# Patient Record
Sex: FEMALE | Race: WHITE | NOT HISPANIC OR LATINO
[De-identification: names, ages, dates, MRNs, and addresses within clinical notes are randomized per-mention and may not be internally consistent; named-entity substitution may affect disease eponyms.]

---

## 2021-07-05 ENCOUNTER — TRANSCRIPTION ENCOUNTER (OUTPATIENT)
Age: 86
End: 2021-07-05

## 2023-01-01 ENCOUNTER — EMERGENCY (EMERGENCY)
Facility: HOSPITAL | Age: 88
LOS: 1 days | Discharge: HOME | End: 2023-01-01
Attending: EMERGENCY MEDICINE
Payer: MEDICARE

## 2023-01-01 ENCOUNTER — NON-APPOINTMENT (OUTPATIENT)
Age: 88
End: 2023-01-01

## 2023-01-01 ENCOUNTER — INPATIENT (INPATIENT)
Facility: HOSPITAL | Age: 88
LOS: 3 days | DRG: 199 | End: 2023-06-20
Attending: STUDENT IN AN ORGANIZED HEALTH CARE EDUCATION/TRAINING PROGRAM | Admitting: STUDENT IN AN ORGANIZED HEALTH CARE EDUCATION/TRAINING PROGRAM
Payer: MEDICARE

## 2023-01-01 VITALS
HEART RATE: 64 BPM | HEIGHT: 60 IN | WEIGHT: 97 LBS | OXYGEN SATURATION: 94 % | RESPIRATION RATE: 20 BRPM | TEMPERATURE: 98 F | DIASTOLIC BLOOD PRESSURE: 77 MMHG | SYSTOLIC BLOOD PRESSURE: 222 MMHG

## 2023-01-01 VITALS
HEART RATE: 60 BPM | SYSTOLIC BLOOD PRESSURE: 196 MMHG | WEIGHT: 100.09 LBS | TEMPERATURE: 98 F | RESPIRATION RATE: 18 BRPM | OXYGEN SATURATION: 98 % | DIASTOLIC BLOOD PRESSURE: 73 MMHG

## 2023-01-01 VITALS
TEMPERATURE: 98 F | SYSTOLIC BLOOD PRESSURE: 190 MMHG | OXYGEN SATURATION: 96 % | DIASTOLIC BLOOD PRESSURE: 69 MMHG | RESPIRATION RATE: 18 BRPM | HEART RATE: 64 BPM

## 2023-01-01 VITALS
TEMPERATURE: 98 F | HEART RATE: 67 BPM | OXYGEN SATURATION: 97 % | DIASTOLIC BLOOD PRESSURE: 72 MMHG | SYSTOLIC BLOOD PRESSURE: 200 MMHG | RESPIRATION RATE: 18 BRPM

## 2023-01-01 VITALS — TEMPERATURE: 97 F | RESPIRATION RATE: 19 BRPM | HEART RATE: 77 BPM

## 2023-01-01 DIAGNOSIS — S22.41XA MULTIPLE FRACTURES OF RIBS, RIGHT SIDE, INITIAL ENCOUNTER FOR CLOSED FRACTURE: ICD-10-CM

## 2023-01-01 DIAGNOSIS — I71.00 DISSECTION OF UNSPECIFIED SITE OF AORTA: ICD-10-CM

## 2023-01-01 DIAGNOSIS — S22.49XA MULTIPLE FRACTURES OF RIBS, UNSPECIFIED SIDE, INITIAL ENCOUNTER FOR CLOSED FRACTURE: ICD-10-CM

## 2023-01-01 LAB
ABO RH CONFIRMATION: SIGNIFICANT CHANGE UP
ALBUMIN SERPL ELPH-MCNC: 3.6 G/DL — SIGNIFICANT CHANGE UP (ref 3.3–5.2)
ALBUMIN SERPL ELPH-MCNC: 3.6 G/DL — SIGNIFICANT CHANGE UP (ref 3.3–5.2)
ALP SERPL-CCNC: 86 U/L — SIGNIFICANT CHANGE UP (ref 40–120)
ALP SERPL-CCNC: 87 U/L — SIGNIFICANT CHANGE UP (ref 40–120)
ALT FLD-CCNC: 16 U/L — SIGNIFICANT CHANGE UP
ALT FLD-CCNC: 16 U/L — SIGNIFICANT CHANGE UP
ANION GAP SERPL CALC-SCNC: 13 MMOL/L — SIGNIFICANT CHANGE UP (ref 5–17)
ANION GAP SERPL CALC-SCNC: 14 MMOL/L — SIGNIFICANT CHANGE UP (ref 5–17)
ANION GAP SERPL CALC-SCNC: 15 MMOL/L — SIGNIFICANT CHANGE UP (ref 5–17)
ANION GAP SERPL CALC-SCNC: 15 MMOL/L — SIGNIFICANT CHANGE UP (ref 5–17)
ANION GAP SERPL CALC-SCNC: 9 MMOL/L — SIGNIFICANT CHANGE UP (ref 5–17)
ANISOCYTOSIS BLD QL: SIGNIFICANT CHANGE UP
ANISOCYTOSIS BLD QL: SLIGHT — SIGNIFICANT CHANGE UP
APTT BLD: 22 SEC — LOW (ref 27.5–35.5)
APTT BLD: 34.6 SEC — SIGNIFICANT CHANGE UP (ref 27.5–35.5)
AST SERPL-CCNC: 21 U/L — SIGNIFICANT CHANGE UP
AST SERPL-CCNC: 22 U/L — SIGNIFICANT CHANGE UP
BASE EXCESS BLDV CALC-SCNC: 3.3 MMOL/L — HIGH (ref -2–3)
BASO STIPL BLD QL SMEAR: PRESENT — SIGNIFICANT CHANGE UP
BASOPHILS # BLD AUTO: 0 K/UL — SIGNIFICANT CHANGE UP (ref 0–0.2)
BASOPHILS # BLD AUTO: 0.09 K/UL — SIGNIFICANT CHANGE UP (ref 0–0.2)
BASOPHILS NFR BLD AUTO: 0 % — SIGNIFICANT CHANGE UP (ref 0–2)
BASOPHILS NFR BLD AUTO: 0.9 % — SIGNIFICANT CHANGE UP (ref 0–2)
BILIRUB DIRECT SERPL-MCNC: 0.1 MG/DL — SIGNIFICANT CHANGE UP (ref 0–0.3)
BILIRUB INDIRECT FLD-MCNC: 0.3 MG/DL — SIGNIFICANT CHANGE UP (ref 0.2–1)
BILIRUB SERPL-MCNC: 0.4 MG/DL — SIGNIFICANT CHANGE UP (ref 0.4–2)
BILIRUB SERPL-MCNC: 0.4 MG/DL — SIGNIFICANT CHANGE UP (ref 0.4–2)
BLD GP AB SCN SERPL QL: SIGNIFICANT CHANGE UP
BUN SERPL-MCNC: 25.7 MG/DL — HIGH (ref 8–20)
BUN SERPL-MCNC: 29 MG/DL — HIGH (ref 8–20)
BUN SERPL-MCNC: 30.4 MG/DL — HIGH (ref 8–20)
BUN SERPL-MCNC: 30.7 MG/DL — HIGH (ref 8–20)
BUN SERPL-MCNC: 34.9 MG/DL — HIGH (ref 8–20)
BUN SERPL-MCNC: 35.7 MG/DL — HIGH (ref 8–20)
BUN SERPL-MCNC: 36.1 MG/DL — HIGH (ref 8–20)
BURR CELLS BLD QL SMEAR: PRESENT — SIGNIFICANT CHANGE UP
BURR CELLS BLD QL SMEAR: PRESENT — SIGNIFICANT CHANGE UP
CA-I SERPL-SCNC: 1.15 MMOL/L — SIGNIFICANT CHANGE UP (ref 1.15–1.33)
CALCIUM SERPL-MCNC: 6.7 MG/DL — LOW (ref 8.4–10.5)
CALCIUM SERPL-MCNC: 7.7 MG/DL — LOW (ref 8.4–10.5)
CALCIUM SERPL-MCNC: 8 MG/DL — LOW (ref 8.4–10.5)
CALCIUM SERPL-MCNC: 8.3 MG/DL — LOW (ref 8.4–10.5)
CALCIUM SERPL-MCNC: 8.3 MG/DL — LOW (ref 8.4–10.5)
CALCIUM SERPL-MCNC: 8.7 MG/DL — SIGNIFICANT CHANGE UP (ref 8.4–10.5)
CALCIUM SERPL-MCNC: 9.2 MG/DL — SIGNIFICANT CHANGE UP (ref 8.4–10.5)
CHLORIDE BLDV-SCNC: 103 MMOL/L — SIGNIFICANT CHANGE UP (ref 96–108)
CHLORIDE SERPL-SCNC: 101 MMOL/L — SIGNIFICANT CHANGE UP (ref 96–108)
CHLORIDE SERPL-SCNC: 103 MMOL/L — SIGNIFICANT CHANGE UP (ref 96–108)
CHLORIDE SERPL-SCNC: 107 MMOL/L — SIGNIFICANT CHANGE UP (ref 96–108)
CO2 SERPL-SCNC: 20 MMOL/L — LOW (ref 22–29)
CO2 SERPL-SCNC: 21 MMOL/L — LOW (ref 22–29)
CO2 SERPL-SCNC: 21 MMOL/L — LOW (ref 22–29)
CO2 SERPL-SCNC: 22 MMOL/L — SIGNIFICANT CHANGE UP (ref 22–29)
CO2 SERPL-SCNC: 25 MMOL/L — SIGNIFICANT CHANGE UP (ref 22–29)
CREAT SERPL-MCNC: 1.27 MG/DL — SIGNIFICANT CHANGE UP (ref 0.5–1.3)
CREAT SERPL-MCNC: 1.45 MG/DL — HIGH (ref 0.5–1.3)
CREAT SERPL-MCNC: 1.6 MG/DL — HIGH (ref 0.5–1.3)
CREAT SERPL-MCNC: 1.78 MG/DL — HIGH (ref 0.5–1.3)
CREAT SERPL-MCNC: 2.03 MG/DL — HIGH (ref 0.5–1.3)
CREAT SERPL-MCNC: 2.03 MG/DL — HIGH (ref 0.5–1.3)
CREAT SERPL-MCNC: 2.11 MG/DL — HIGH (ref 0.5–1.3)
EGFR: 21 ML/MIN/1.73M2 — LOW
EGFR: 22 ML/MIN/1.73M2 — LOW
EGFR: 22 ML/MIN/1.73M2 — LOW
EGFR: 26 ML/MIN/1.73M2 — LOW
EGFR: 30 ML/MIN/1.73M2 — LOW
EGFR: 33 ML/MIN/1.73M2 — LOW
EGFR: 39 ML/MIN/1.73M2 — LOW
ELLIPTOCYTES BLD QL SMEAR: SIGNIFICANT CHANGE UP
ELLIPTOCYTES BLD QL SMEAR: SLIGHT — SIGNIFICANT CHANGE UP
ELLIPTOCYTES BLD QL SMEAR: SLIGHT — SIGNIFICANT CHANGE UP
EOSINOPHIL # BLD AUTO: 0 K/UL — SIGNIFICANT CHANGE UP (ref 0–0.5)
EOSINOPHIL # BLD AUTO: 0.07 K/UL — SIGNIFICANT CHANGE UP (ref 0–0.5)
EOSINOPHIL # BLD AUTO: 0.18 K/UL — SIGNIFICANT CHANGE UP (ref 0–0.5)
EOSINOPHIL NFR BLD AUTO: 0 % — SIGNIFICANT CHANGE UP (ref 0–6)
EOSINOPHIL NFR BLD AUTO: 0.9 % — SIGNIFICANT CHANGE UP (ref 0–6)
EOSINOPHIL NFR BLD AUTO: 1.8 % — SIGNIFICANT CHANGE UP (ref 0–6)
FIBRINOGEN PPP-MCNC: 401 MG/DL — SIGNIFICANT CHANGE UP (ref 200–450)
GAS PNL BLDA: SIGNIFICANT CHANGE UP
GAS PNL BLDV: 133 MMOL/L — LOW (ref 136–145)
GAS PNL BLDV: SIGNIFICANT CHANGE UP
GIANT PLATELETS BLD QL SMEAR: PRESENT — SIGNIFICANT CHANGE UP
GIANT PLATELETS BLD QL SMEAR: PRESENT — SIGNIFICANT CHANGE UP
GLUCOSE BLDV-MCNC: 139 MG/DL — HIGH (ref 70–99)
GLUCOSE SERPL-MCNC: 110 MG/DL — HIGH (ref 70–99)
GLUCOSE SERPL-MCNC: 111 MG/DL — HIGH (ref 70–99)
GLUCOSE SERPL-MCNC: 113 MG/DL — HIGH (ref 70–99)
GLUCOSE SERPL-MCNC: 132 MG/DL — HIGH (ref 70–99)
GLUCOSE SERPL-MCNC: 84 MG/DL — SIGNIFICANT CHANGE UP (ref 70–99)
GLUCOSE SERPL-MCNC: 90 MG/DL — SIGNIFICANT CHANGE UP (ref 70–99)
GLUCOSE SERPL-MCNC: 90 MG/DL — SIGNIFICANT CHANGE UP (ref 70–99)
HCO3 BLDV-SCNC: 27 MMOL/L — SIGNIFICANT CHANGE UP (ref 22–29)
HCT VFR BLD CALC: 18.7 % — CRITICAL LOW (ref 34.5–45)
HCT VFR BLD CALC: 25.6 % — LOW (ref 34.5–45)
HCT VFR BLD CALC: 27.8 % — LOW (ref 34.5–45)
HCT VFR BLD CALC: 28.1 % — LOW (ref 34.5–45)
HCT VFR BLD CALC: 32.5 % — LOW (ref 34.5–45)
HCT VFR BLD CALC: 35.4 % — SIGNIFICANT CHANGE UP (ref 34.5–45)
HCT VFR BLD CALC: 35.6 % — SIGNIFICANT CHANGE UP (ref 34.5–45)
HCT VFR BLDA CALC: 26 % — SIGNIFICANT CHANGE UP
HGB BLD CALC-MCNC: 8.7 G/DL — LOW (ref 11.7–16.1)
HGB BLD-MCNC: 10.5 G/DL — LOW (ref 11.5–15.5)
HGB BLD-MCNC: 11.4 G/DL — LOW (ref 11.5–15.5)
HGB BLD-MCNC: 11.4 G/DL — LOW (ref 11.5–15.5)
HGB BLD-MCNC: 5.7 G/DL — CRITICAL LOW (ref 11.5–15.5)
HGB BLD-MCNC: 8.1 G/DL — LOW (ref 11.5–15.5)
HGB BLD-MCNC: 8.8 G/DL — LOW (ref 11.5–15.5)
HGB BLD-MCNC: 8.8 G/DL — LOW (ref 11.5–15.5)
HYPOCHROMIA BLD QL: SLIGHT — SIGNIFICANT CHANGE UP
INR BLD: 1.02 RATIO — SIGNIFICANT CHANGE UP (ref 0.88–1.16)
INR BLD: 1.03 RATIO — SIGNIFICANT CHANGE UP (ref 0.88–1.16)
INR BLD: 1.07 RATIO — SIGNIFICANT CHANGE UP (ref 0.88–1.16)
LACTATE BLDV-MCNC: 1.3 MMOL/L — SIGNIFICANT CHANGE UP (ref 0.5–2)
LACTATE SERPL-SCNC: 0.7 MMOL/L — SIGNIFICANT CHANGE UP (ref 0.5–2)
LYMPHOCYTES # BLD AUTO: 0.15 K/UL — LOW (ref 1–3.3)
LYMPHOCYTES # BLD AUTO: 0.17 K/UL — LOW (ref 1–3.3)
LYMPHOCYTES # BLD AUTO: 0.4 K/UL — LOW (ref 1–3.3)
LYMPHOCYTES # BLD AUTO: 0.77 K/UL — LOW (ref 1–3.3)
LYMPHOCYTES # BLD AUTO: 1.52 K/UL — SIGNIFICANT CHANGE UP (ref 1–3.3)
LYMPHOCYTES # BLD AUTO: 1.8 % — LOW (ref 13–44)
LYMPHOCYTES # BLD AUTO: 12.4 % — LOW (ref 13–44)
LYMPHOCYTES # BLD AUTO: 15 % — SIGNIFICANT CHANGE UP (ref 13–44)
LYMPHOCYTES # BLD AUTO: 2.6 % — LOW (ref 13–44)
LYMPHOCYTES # BLD AUTO: 5.2 % — LOW (ref 13–44)
MACROCYTES BLD QL: SLIGHT — SIGNIFICANT CHANGE UP
MAGNESIUM SERPL-MCNC: 1.6 MG/DL — SIGNIFICANT CHANGE UP (ref 1.6–2.6)
MAGNESIUM SERPL-MCNC: 2 MG/DL — SIGNIFICANT CHANGE UP (ref 1.6–2.6)
MAGNESIUM SERPL-MCNC: 2 MG/DL — SIGNIFICANT CHANGE UP (ref 1.8–2.6)
MAGNESIUM SERPL-MCNC: 2.1 MG/DL — SIGNIFICANT CHANGE UP (ref 1.6–2.6)
MAGNESIUM SERPL-MCNC: 2.2 MG/DL — SIGNIFICANT CHANGE UP (ref 1.6–2.6)
MAGNESIUM SERPL-MCNC: 2.3 MG/DL — SIGNIFICANT CHANGE UP (ref 1.6–2.6)
MANUAL SMEAR VERIFICATION: SIGNIFICANT CHANGE UP
MCHC RBC-ENTMCNC: 18.6 PG — LOW (ref 27–34)
MCHC RBC-ENTMCNC: 18.9 PG — LOW (ref 27–34)
MCHC RBC-ENTMCNC: 19 PG — LOW (ref 27–34)
MCHC RBC-ENTMCNC: 21.1 PG — LOW (ref 27–34)
MCHC RBC-ENTMCNC: 21.8 PG — LOW (ref 27–34)
MCHC RBC-ENTMCNC: 21.8 PG — LOW (ref 27–34)
MCHC RBC-ENTMCNC: 21.9 PG — LOW (ref 27–34)
MCHC RBC-ENTMCNC: 30.5 GM/DL — LOW (ref 32–36)
MCHC RBC-ENTMCNC: 31.3 GM/DL — LOW (ref 32–36)
MCHC RBC-ENTMCNC: 31.6 GM/DL — LOW (ref 32–36)
MCHC RBC-ENTMCNC: 31.7 GM/DL — LOW (ref 32–36)
MCHC RBC-ENTMCNC: 32 GM/DL — SIGNIFICANT CHANGE UP (ref 32–36)
MCHC RBC-ENTMCNC: 32.2 GM/DL — SIGNIFICANT CHANGE UP (ref 32–36)
MCHC RBC-ENTMCNC: 32.3 GM/DL — SIGNIFICANT CHANGE UP (ref 32–36)
MCV RBC AUTO: 59.7 FL — LOW (ref 80–100)
MCV RBC AUTO: 60.6 FL — LOW (ref 80–100)
MCV RBC AUTO: 60.9 FL — LOW (ref 80–100)
MCV RBC AUTO: 66.5 FL — LOW (ref 80–100)
MCV RBC AUTO: 67.6 FL — LOW (ref 80–100)
MCV RBC AUTO: 67.9 FL — LOW (ref 80–100)
MCV RBC AUTO: 68.1 FL — LOW (ref 80–100)
MICROCYTES BLD QL: SIGNIFICANT CHANGE UP
MICROCYTES BLD QL: SLIGHT — SIGNIFICANT CHANGE UP
MONOCYTES # BLD AUTO: 0.05 K/UL — SIGNIFICANT CHANGE UP (ref 0–0.9)
MONOCYTES # BLD AUTO: 0.25 K/UL — SIGNIFICANT CHANGE UP (ref 0–0.9)
MONOCYTES # BLD AUTO: 0.33 K/UL — SIGNIFICANT CHANGE UP (ref 0–0.9)
MONOCYTES # BLD AUTO: 0.4 K/UL — SIGNIFICANT CHANGE UP (ref 0–0.9)
MONOCYTES # BLD AUTO: 0.72 K/UL — SIGNIFICANT CHANGE UP (ref 0–0.9)
MONOCYTES NFR BLD AUTO: 0.9 % — LOW (ref 2–14)
MONOCYTES NFR BLD AUTO: 2.6 % — SIGNIFICANT CHANGE UP (ref 2–14)
MONOCYTES NFR BLD AUTO: 5.2 % — SIGNIFICANT CHANGE UP (ref 2–14)
MONOCYTES NFR BLD AUTO: 5.3 % — SIGNIFICANT CHANGE UP (ref 2–14)
MONOCYTES NFR BLD AUTO: 7.1 % — SIGNIFICANT CHANGE UP (ref 2–14)
MRSA PCR RESULT.: SIGNIFICANT CHANGE UP
NEUTROPHILS # BLD AUTO: 5.08 K/UL — SIGNIFICANT CHANGE UP (ref 1.8–7.4)
NEUTROPHILS # BLD AUTO: 5.58 K/UL — SIGNIFICANT CHANGE UP (ref 1.8–7.4)
NEUTROPHILS # BLD AUTO: 6.77 K/UL — SIGNIFICANT CHANGE UP (ref 1.8–7.4)
NEUTROPHILS # BLD AUTO: 7.63 K/UL — HIGH (ref 1.8–7.4)
NEUTROPHILS # BLD AUTO: 8.92 K/UL — HIGH (ref 1.8–7.4)
NEUTROPHILS NFR BLD AUTO: 75.2 % — SIGNIFICANT CHANGE UP (ref 43–77)
NEUTROPHILS NFR BLD AUTO: 82.3 % — HIGH (ref 43–77)
NEUTROPHILS NFR BLD AUTO: 87.8 % — HIGH (ref 43–77)
NEUTROPHILS NFR BLD AUTO: 90.3 % — HIGH (ref 43–77)
NEUTROPHILS NFR BLD AUTO: 96.5 % — HIGH (ref 43–77)
NEUTS BAND # BLD: 2.6 % — SIGNIFICANT CHANGE UP (ref 0–8)
NRBC # BLD: 1 /100 — HIGH (ref 0–0)
OVALOCYTES BLD QL SMEAR: SIGNIFICANT CHANGE UP
OVALOCYTES BLD QL SMEAR: SIGNIFICANT CHANGE UP
OVALOCYTES BLD QL SMEAR: SLIGHT — SIGNIFICANT CHANGE UP
OVALOCYTES BLD QL SMEAR: SLIGHT — SIGNIFICANT CHANGE UP
PCO2 BLDV: 36 MMHG — LOW (ref 39–42)
PH BLDV: 7.48 — HIGH (ref 7.32–7.43)
PHOSPHATE SERPL-MCNC: 5.2 MG/DL — HIGH (ref 2.4–4.7)
PHOSPHATE SERPL-MCNC: 5.8 MG/DL — HIGH (ref 2.4–4.7)
PHOSPHATE SERPL-MCNC: 5.9 MG/DL — HIGH (ref 2.4–4.7)
PHOSPHATE SERPL-MCNC: 6.4 MG/DL — HIGH (ref 2.4–4.7)
PHOSPHATE SERPL-MCNC: 6.4 MG/DL — HIGH (ref 2.4–4.7)
PHOSPHATE SERPL-MCNC: 6.6 MG/DL — HIGH (ref 2.4–4.7)
PLAT MORPH BLD: NORMAL — SIGNIFICANT CHANGE UP
PLATELET # BLD AUTO: 128 K/UL — LOW (ref 150–400)
PLATELET # BLD AUTO: 136 K/UL — LOW (ref 150–400)
PLATELET # BLD AUTO: 139 K/UL — LOW (ref 150–400)
PLATELET # BLD AUTO: 159 K/UL — SIGNIFICANT CHANGE UP (ref 150–400)
PLATELET # BLD AUTO: 160 K/UL — SIGNIFICANT CHANGE UP (ref 150–400)
PLATELET # BLD AUTO: 227 K/UL — SIGNIFICANT CHANGE UP (ref 150–400)
PLATELET # BLD AUTO: 245 K/UL — SIGNIFICANT CHANGE UP (ref 150–400)
PO2 BLDV: 151 MMHG — HIGH (ref 25–45)
POIKILOCYTOSIS BLD QL AUTO: SIGNIFICANT CHANGE UP
POIKILOCYTOSIS BLD QL AUTO: SLIGHT — SIGNIFICANT CHANGE UP
POLYCHROMASIA BLD QL SMEAR: SIGNIFICANT CHANGE UP
POLYCHROMASIA BLD QL SMEAR: SIGNIFICANT CHANGE UP
POLYCHROMASIA BLD QL SMEAR: SLIGHT — SIGNIFICANT CHANGE UP
POTASSIUM BLDV-SCNC: 4.9 MMOL/L — SIGNIFICANT CHANGE UP (ref 3.5–5.1)
POTASSIUM SERPL-MCNC: 4.3 MMOL/L — SIGNIFICANT CHANGE UP (ref 3.5–5.3)
POTASSIUM SERPL-MCNC: 4.8 MMOL/L — SIGNIFICANT CHANGE UP (ref 3.5–5.3)
POTASSIUM SERPL-MCNC: 4.9 MMOL/L — SIGNIFICANT CHANGE UP (ref 3.5–5.3)
POTASSIUM SERPL-MCNC: 5.1 MMOL/L — SIGNIFICANT CHANGE UP (ref 3.5–5.3)
POTASSIUM SERPL-MCNC: 5.1 MMOL/L — SIGNIFICANT CHANGE UP (ref 3.5–5.3)
POTASSIUM SERPL-MCNC: 5.5 MMOL/L — HIGH (ref 3.5–5.3)
POTASSIUM SERPL-MCNC: 5.5 MMOL/L — HIGH (ref 3.5–5.3)
POTASSIUM SERPL-SCNC: 4.3 MMOL/L — SIGNIFICANT CHANGE UP (ref 3.5–5.3)
POTASSIUM SERPL-SCNC: 4.8 MMOL/L — SIGNIFICANT CHANGE UP (ref 3.5–5.3)
POTASSIUM SERPL-SCNC: 4.9 MMOL/L — SIGNIFICANT CHANGE UP (ref 3.5–5.3)
POTASSIUM SERPL-SCNC: 5.1 MMOL/L — SIGNIFICANT CHANGE UP (ref 3.5–5.3)
POTASSIUM SERPL-SCNC: 5.1 MMOL/L — SIGNIFICANT CHANGE UP (ref 3.5–5.3)
POTASSIUM SERPL-SCNC: 5.5 MMOL/L — HIGH (ref 3.5–5.3)
POTASSIUM SERPL-SCNC: 5.5 MMOL/L — HIGH (ref 3.5–5.3)
PROT SERPL-MCNC: 6.6 G/DL — SIGNIFICANT CHANGE UP (ref 6.6–8.7)
PROT SERPL-MCNC: 6.7 G/DL — SIGNIFICANT CHANGE UP (ref 6.6–8.7)
PROTHROM AB SERPL-ACNC: 11.8 SEC — SIGNIFICANT CHANGE UP (ref 10.5–13.4)
PROTHROM AB SERPL-ACNC: 11.9 SEC — SIGNIFICANT CHANGE UP (ref 10.5–13.4)
PROTHROM AB SERPL-ACNC: 12.4 SEC — SIGNIFICANT CHANGE UP (ref 10.5–13.4)
RBC # BLD: 3.07 M/UL — LOW (ref 3.8–5.2)
RBC # BLD: 4.18 M/UL — SIGNIFICANT CHANGE UP (ref 3.8–5.2)
RBC # BLD: 4.29 M/UL — SIGNIFICANT CHANGE UP (ref 3.8–5.2)
RBC # BLD: 4.64 M/UL — SIGNIFICANT CHANGE UP (ref 3.8–5.2)
RBC # BLD: 4.81 M/UL — SIGNIFICANT CHANGE UP (ref 3.8–5.2)
RBC # BLD: 5.21 M/UL — HIGH (ref 3.8–5.2)
RBC # BLD: 5.23 M/UL — HIGH (ref 3.8–5.2)
RBC # FLD: 17.2 % — HIGH (ref 10.3–14.5)
RBC # FLD: 22 % — HIGH (ref 10.3–14.5)
RBC # FLD: 22.4 % — HIGH (ref 10.3–14.5)
RBC # FLD: 22.5 % — HIGH (ref 10.3–14.5)
RBC # FLD: 22.9 % — HIGH (ref 10.3–14.5)
RBC BLD AUTO: ABNORMAL
ROULEAUX BLD QL SMEAR: PRESENT
S AUREUS DNA NOSE QL NAA+PROBE: DETECTED
SAO2 % BLDV: 98.8 % — SIGNIFICANT CHANGE UP
SCHISTOCYTES BLD QL AUTO: SIGNIFICANT CHANGE UP
SCHISTOCYTES BLD QL AUTO: SLIGHT — SIGNIFICANT CHANGE UP
SCHISTOCYTES BLD QL AUTO: SLIGHT — SIGNIFICANT CHANGE UP
SMUDGE CELLS # BLD: PRESENT — SIGNIFICANT CHANGE UP
SODIUM SERPL-SCNC: 135 MMOL/L — SIGNIFICANT CHANGE UP (ref 135–145)
SODIUM SERPL-SCNC: 136 MMOL/L — SIGNIFICANT CHANGE UP (ref 135–145)
SODIUM SERPL-SCNC: 137 MMOL/L — SIGNIFICANT CHANGE UP (ref 135–145)
SODIUM SERPL-SCNC: 137 MMOL/L — SIGNIFICANT CHANGE UP (ref 135–145)
SODIUM SERPL-SCNC: 138 MMOL/L — SIGNIFICANT CHANGE UP (ref 135–145)
TARGETS BLD QL SMEAR: SLIGHT — SIGNIFICANT CHANGE UP
VARIANT LYMPHS # BLD: 0.9 % — SIGNIFICANT CHANGE UP (ref 0–6)
VARIANT LYMPHS # BLD: 0.9 % — SIGNIFICANT CHANGE UP (ref 0–6)
VARIANT LYMPHS # BLD: 1.8 % — SIGNIFICANT CHANGE UP (ref 0–6)
WBC # BLD: 10.14 K/UL — SIGNIFICANT CHANGE UP (ref 3.8–10.5)
WBC # BLD: 10.47 K/UL — SIGNIFICANT CHANGE UP (ref 3.8–10.5)
WBC # BLD: 17.8 K/UL — HIGH (ref 3.8–10.5)
WBC # BLD: 5.78 K/UL — SIGNIFICANT CHANGE UP (ref 3.8–10.5)
WBC # BLD: 6.17 K/UL — SIGNIFICANT CHANGE UP (ref 3.8–10.5)
WBC # BLD: 7.71 K/UL — SIGNIFICANT CHANGE UP (ref 3.8–10.5)
WBC # BLD: 9.6 K/UL — SIGNIFICANT CHANGE UP (ref 3.8–10.5)
WBC # FLD AUTO: 10.14 K/UL — SIGNIFICANT CHANGE UP (ref 3.8–10.5)
WBC # FLD AUTO: 10.47 K/UL — SIGNIFICANT CHANGE UP (ref 3.8–10.5)
WBC # FLD AUTO: 17.8 K/UL — HIGH (ref 3.8–10.5)
WBC # FLD AUTO: 5.78 K/UL — SIGNIFICANT CHANGE UP (ref 3.8–10.5)
WBC # FLD AUTO: 6.17 K/UL — SIGNIFICANT CHANGE UP (ref 3.8–10.5)
WBC # FLD AUTO: 7.71 K/UL — SIGNIFICANT CHANGE UP (ref 3.8–10.5)
WBC # FLD AUTO: 9.6 K/UL — SIGNIFICANT CHANGE UP (ref 3.8–10.5)

## 2023-01-01 PROCEDURE — 84132 ASSAY OF SERUM POTASSIUM: CPT

## 2023-01-01 PROCEDURE — 70450 CT HEAD/BRAIN W/O DYE: CPT | Mod: 26,MA

## 2023-01-01 PROCEDURE — 99291 CRITICAL CARE FIRST HOUR: CPT

## 2023-01-01 PROCEDURE — 82435 ASSAY OF BLOOD CHLORIDE: CPT

## 2023-01-01 PROCEDURE — 90715 TDAP VACCINE 7 YRS/> IM: CPT

## 2023-01-01 PROCEDURE — 72192 CT PELVIS W/O DYE: CPT | Mod: 26,MA

## 2023-01-01 PROCEDURE — 93010 ELECTROCARDIOGRAM REPORT: CPT | Mod: 77

## 2023-01-01 PROCEDURE — 72125 CT NECK SPINE W/O DYE: CPT | Mod: MA

## 2023-01-01 PROCEDURE — 72192 CT PELVIS W/O DYE: CPT | Mod: MA

## 2023-01-01 PROCEDURE — 80048 BASIC METABOLIC PNL TOTAL CA: CPT

## 2023-01-01 PROCEDURE — 84295 ASSAY OF SERUM SODIUM: CPT

## 2023-01-01 PROCEDURE — 86900 BLOOD TYPING SEROLOGIC ABO: CPT

## 2023-01-01 PROCEDURE — 99291 CRITICAL CARE FIRST HOUR: CPT | Mod: 25

## 2023-01-01 PROCEDURE — 84100 ASSAY OF PHOSPHORUS: CPT

## 2023-01-01 PROCEDURE — 71275 CT ANGIOGRAPHY CHEST: CPT | Mod: MA

## 2023-01-01 PROCEDURE — 36415 COLL VENOUS BLD VENIPUNCTURE: CPT

## 2023-01-01 PROCEDURE — 86850 RBC ANTIBODY SCREEN: CPT

## 2023-01-01 PROCEDURE — 71045 X-RAY EXAM CHEST 1 VIEW: CPT | Mod: 26

## 2023-01-01 PROCEDURE — 83605 ASSAY OF LACTIC ACID: CPT

## 2023-01-01 PROCEDURE — 83735 ASSAY OF MAGNESIUM: CPT

## 2023-01-01 PROCEDURE — P9040: CPT

## 2023-01-01 PROCEDURE — 94660 CPAP INITIATION&MGMT: CPT

## 2023-01-01 PROCEDURE — 80076 HEPATIC FUNCTION PANEL: CPT

## 2023-01-01 PROCEDURE — 86923 COMPATIBILITY TEST ELECTRIC: CPT

## 2023-01-01 PROCEDURE — 71250 CT THORAX DX C-: CPT | Mod: MA

## 2023-01-01 PROCEDURE — 99223 1ST HOSP IP/OBS HIGH 75: CPT

## 2023-01-01 PROCEDURE — 74176 CT ABD & PELVIS W/O CONTRAST: CPT | Mod: 26,59,MA

## 2023-01-01 PROCEDURE — 12002 RPR S/N/AX/GEN/TRNK2.6-7.5CM: CPT

## 2023-01-01 PROCEDURE — 80053 COMPREHEN METABOLIC PANEL: CPT

## 2023-01-01 PROCEDURE — 72125 CT NECK SPINE W/O DYE: CPT | Mod: 26,MA

## 2023-01-01 PROCEDURE — 94760 N-INVAS EAR/PLS OXIMETRY 1: CPT

## 2023-01-01 PROCEDURE — 73600 X-RAY EXAM OF ANKLE: CPT | Mod: 26,RT

## 2023-01-01 PROCEDURE — 70450 CT HEAD/BRAIN W/O DYE: CPT | Mod: MA

## 2023-01-01 PROCEDURE — 86901 BLOOD TYPING SEROLOGIC RH(D): CPT

## 2023-01-01 PROCEDURE — 74174 CTA ABD&PLVS W/CONTRAST: CPT | Mod: 26,MA

## 2023-01-01 PROCEDURE — 99284 EMERGENCY DEPT VISIT MOD MDM: CPT | Mod: 25

## 2023-01-01 PROCEDURE — 70498 CT ANGIOGRAPHY NECK: CPT | Mod: 26,MA

## 2023-01-01 PROCEDURE — 85610 PROTHROMBIN TIME: CPT

## 2023-01-01 PROCEDURE — 87640 STAPH A DNA AMP PROBE: CPT

## 2023-01-01 PROCEDURE — 85027 COMPLETE CBC AUTOMATED: CPT

## 2023-01-01 PROCEDURE — 96374 THER/PROPH/DIAG INJ IV PUSH: CPT

## 2023-01-01 PROCEDURE — 71045 X-RAY EXAM CHEST 1 VIEW: CPT | Mod: 26,77

## 2023-01-01 PROCEDURE — 99231 SBSQ HOSP IP/OBS SF/LOW 25: CPT

## 2023-01-01 PROCEDURE — 85025 COMPLETE CBC W/AUTO DIFF WBC: CPT

## 2023-01-01 PROCEDURE — 85014 HEMATOCRIT: CPT

## 2023-01-01 PROCEDURE — 74174 CTA ABD&PLVS W/CONTRAST: CPT | Mod: MA

## 2023-01-01 PROCEDURE — 73590 X-RAY EXAM OF LOWER LEG: CPT

## 2023-01-01 PROCEDURE — P9016: CPT

## 2023-01-01 PROCEDURE — 87641 MR-STAPH DNA AMP PROBE: CPT

## 2023-01-01 PROCEDURE — 93005 ELECTROCARDIOGRAM TRACING: CPT

## 2023-01-01 PROCEDURE — 36430 TRANSFUSION BLD/BLD COMPNT: CPT

## 2023-01-01 PROCEDURE — 70498 CT ANGIOGRAPHY NECK: CPT | Mod: MA

## 2023-01-01 PROCEDURE — 71045 X-RAY EXAM CHEST 1 VIEW: CPT

## 2023-01-01 PROCEDURE — 85018 HEMOGLOBIN: CPT

## 2023-01-01 PROCEDURE — 73600 X-RAY EXAM OF ANKLE: CPT

## 2023-01-01 PROCEDURE — 74176 CT ABD & PELVIS W/O CONTRAST: CPT | Mod: MA

## 2023-01-01 PROCEDURE — 99222 1ST HOSP IP/OBS MODERATE 55: CPT | Mod: FS

## 2023-01-01 PROCEDURE — 71275 CT ANGIOGRAPHY CHEST: CPT | Mod: 26,MA

## 2023-01-01 PROCEDURE — 82330 ASSAY OF CALCIUM: CPT

## 2023-01-01 PROCEDURE — 82803 BLOOD GASES ANY COMBINATION: CPT

## 2023-01-01 PROCEDURE — 90471 IMMUNIZATION ADMIN: CPT

## 2023-01-01 PROCEDURE — 73590 X-RAY EXAM OF LOWER LEG: CPT | Mod: 26,RT

## 2023-01-01 PROCEDURE — 85730 THROMBOPLASTIN TIME PARTIAL: CPT

## 2023-01-01 PROCEDURE — 85384 FIBRINOGEN ACTIVITY: CPT

## 2023-01-01 PROCEDURE — 82947 ASSAY GLUCOSE BLOOD QUANT: CPT

## 2023-01-01 PROCEDURE — 99284 EMERGENCY DEPT VISIT MOD MDM: CPT

## 2023-01-01 PROCEDURE — 71250 CT THORAX DX C-: CPT | Mod: 26,59,MA

## 2023-01-01 RX ORDER — ROBINUL 0.2 MG/ML
0.4 INJECTION INTRAMUSCULAR; INTRAVENOUS
Refills: 0 | Status: DISCONTINUED | OUTPATIENT
Start: 2023-01-01 | End: 2023-01-01

## 2023-01-01 RX ORDER — METOPROLOL TARTRATE 50 MG
25 TABLET ORAL
Refills: 0 | Status: DISCONTINUED | OUTPATIENT
Start: 2023-01-01 | End: 2023-01-01

## 2023-01-01 RX ORDER — LEVOTHYROXINE SODIUM 125 MCG
20 TABLET ORAL AT BEDTIME
Refills: 0 | Status: DISCONTINUED | OUTPATIENT
Start: 2023-01-01 | End: 2023-01-01

## 2023-01-01 RX ORDER — HYDRALAZINE HCL 50 MG
10 TABLET ORAL ONCE
Refills: 0 | Status: COMPLETED | OUTPATIENT
Start: 2023-01-01 | End: 2023-01-01

## 2023-01-01 RX ORDER — SODIUM CHLORIDE 9 MG/ML
1000 INJECTION, SOLUTION INTRAVENOUS
Refills: 0 | Status: DISCONTINUED | OUTPATIENT
Start: 2023-01-01 | End: 2023-01-01

## 2023-01-01 RX ORDER — ACETAMINOPHEN 500 MG
650 TABLET ORAL ONCE
Refills: 0 | Status: COMPLETED | OUTPATIENT
Start: 2023-01-01 | End: 2023-01-01

## 2023-01-01 RX ORDER — ONDANSETRON 8 MG/1
4 TABLET, FILM COATED ORAL ONCE
Refills: 0 | Status: COMPLETED | OUTPATIENT
Start: 2023-01-01 | End: 2023-01-01

## 2023-01-01 RX ORDER — LANOLIN ALCOHOL/MO/W.PET/CERES
5 CREAM (GRAM) TOPICAL AT BEDTIME
Refills: 0 | Status: DISCONTINUED | OUTPATIENT
Start: 2023-01-01 | End: 2023-01-01

## 2023-01-01 RX ORDER — LOSARTAN POTASSIUM 100 MG/1
100 TABLET, FILM COATED ORAL DAILY
Refills: 0 | Status: DISCONTINUED | OUTPATIENT
Start: 2023-01-01 | End: 2023-01-01

## 2023-01-01 RX ORDER — ROBINUL 0.2 MG/ML
0.2 INJECTION INTRAMUSCULAR; INTRAVENOUS ONCE
Refills: 0 | Status: COMPLETED | OUTPATIENT
Start: 2023-01-01 | End: 2023-01-01

## 2023-01-01 RX ORDER — MORPHINE SULFATE 50 MG/1
2 CAPSULE, EXTENDED RELEASE ORAL
Qty: 250 | Refills: 0 | Status: DISCONTINUED | OUTPATIENT
Start: 2023-01-01 | End: 2023-01-01

## 2023-01-01 RX ORDER — METOPROLOL TARTRATE 50 MG
25 TABLET ORAL EVERY 12 HOURS
Refills: 0 | Status: DISCONTINUED | OUTPATIENT
Start: 2023-01-01 | End: 2023-01-01

## 2023-01-01 RX ORDER — SODIUM CHLORIDE 9 MG/ML
500 INJECTION, SOLUTION INTRAVENOUS
Refills: 0 | Status: DISCONTINUED | OUTPATIENT
Start: 2023-01-01 | End: 2023-01-01

## 2023-01-01 RX ORDER — MORPHINE SULFATE 50 MG/1
2 CAPSULE, EXTENDED RELEASE ORAL
Qty: 100 | Refills: 0 | Status: DISCONTINUED | OUTPATIENT
Start: 2023-01-01 | End: 2023-01-01

## 2023-01-01 RX ORDER — METHOCARBAMOL 500 MG/1
500 TABLET, FILM COATED ORAL EVERY 8 HOURS
Refills: 0 | Status: DISCONTINUED | OUTPATIENT
Start: 2023-01-01 | End: 2023-01-01

## 2023-01-01 RX ORDER — AMANTADINE HCL 100 MG
100 CAPSULE ORAL EVERY 12 HOURS
Refills: 0 | Status: DISCONTINUED | OUTPATIENT
Start: 2023-01-01 | End: 2023-01-01

## 2023-01-01 RX ORDER — SODIUM CHLORIDE 9 MG/ML
500 INJECTION INTRAMUSCULAR; INTRAVENOUS; SUBCUTANEOUS ONCE
Refills: 0 | Status: COMPLETED | OUTPATIENT
Start: 2023-01-01 | End: 2023-01-01

## 2023-01-01 RX ORDER — ACETAMINOPHEN 500 MG
650 TABLET ORAL EVERY 6 HOURS
Refills: 0 | Status: DISCONTINUED | OUTPATIENT
Start: 2023-01-01 | End: 2023-01-01

## 2023-01-01 RX ORDER — METOPROLOL TARTRATE 50 MG
5 TABLET ORAL EVERY 6 HOURS
Refills: 0 | Status: DISCONTINUED | OUTPATIENT
Start: 2023-01-01 | End: 2023-01-01

## 2023-01-01 RX ORDER — OXYCODONE HYDROCHLORIDE 5 MG/1
2.5 TABLET ORAL EVERY 6 HOURS
Refills: 0 | Status: DISCONTINUED | OUTPATIENT
Start: 2023-01-01 | End: 2023-01-01

## 2023-01-01 RX ORDER — ACETAMINOPHEN 500 MG
650 TABLET ORAL EVERY 6 HOURS
Refills: 0 | Status: COMPLETED | OUTPATIENT
Start: 2023-01-01 | End: 2023-01-01

## 2023-01-01 RX ORDER — SODIUM CHLORIDE 9 MG/ML
500 INJECTION, SOLUTION INTRAVENOUS ONCE
Refills: 0 | Status: COMPLETED | OUTPATIENT
Start: 2023-01-01 | End: 2023-01-01

## 2023-01-01 RX ORDER — SIMVASTATIN 20 MG/1
20 TABLET, FILM COATED ORAL AT BEDTIME
Refills: 0 | Status: DISCONTINUED | OUTPATIENT
Start: 2023-01-01 | End: 2023-01-01

## 2023-01-01 RX ORDER — TETANUS TOXOID, REDUCED DIPHTHERIA TOXOID AND ACELLULAR PERTUSSIS VACCINE, ADSORBED 5; 2.5; 8; 8; 2.5 [IU]/.5ML; [IU]/.5ML; UG/.5ML; UG/.5ML; UG/.5ML
0.5 SUSPENSION INTRAMUSCULAR ONCE
Refills: 0 | Status: COMPLETED | OUTPATIENT
Start: 2023-01-01 | End: 2023-01-01

## 2023-01-01 RX ORDER — GABAPENTIN 400 MG/1
300 CAPSULE ORAL EVERY 8 HOURS
Refills: 0 | Status: DISCONTINUED | OUTPATIENT
Start: 2023-01-01 | End: 2023-01-01

## 2023-01-01 RX ORDER — AMLODIPINE BESYLATE 2.5 MG/1
2.5 TABLET ORAL ONCE
Refills: 0 | Status: COMPLETED | OUTPATIENT
Start: 2023-01-01 | End: 2023-01-01

## 2023-01-01 RX ORDER — ESCITALOPRAM OXALATE 10 MG/1
10 TABLET, FILM COATED ORAL DAILY
Refills: 0 | Status: DISCONTINUED | OUTPATIENT
Start: 2023-01-01 | End: 2023-01-01

## 2023-01-01 RX ORDER — LIDOCAINE 4 G/100G
2 CREAM TOPICAL EVERY 24 HOURS
Refills: 0 | Status: DISCONTINUED | OUTPATIENT
Start: 2023-01-01 | End: 2023-01-01

## 2023-01-01 RX ORDER — HYDROMORPHONE HYDROCHLORIDE 2 MG/ML
0.2 INJECTION INTRAMUSCULAR; INTRAVENOUS; SUBCUTANEOUS ONCE
Refills: 0 | Status: DISCONTINUED | OUTPATIENT
Start: 2023-01-01 | End: 2023-01-01

## 2023-01-01 RX ORDER — OXYCODONE HYDROCHLORIDE 5 MG/1
5 TABLET ORAL EVERY 6 HOURS
Refills: 0 | Status: DISCONTINUED | OUTPATIENT
Start: 2023-01-01 | End: 2023-01-01

## 2023-01-01 RX ORDER — CALCIUM GLUCONATE 100 MG/ML
2 VIAL (ML) INTRAVENOUS ONCE
Refills: 0 | Status: COMPLETED | OUTPATIENT
Start: 2023-01-01 | End: 2023-01-01

## 2023-01-01 RX ORDER — AMLODIPINE BESYLATE 2.5 MG/1
2.5 TABLET ORAL DAILY
Refills: 0 | Status: DISCONTINUED | OUTPATIENT
Start: 2023-01-01 | End: 2023-01-01

## 2023-01-01 RX ORDER — QUETIAPINE FUMARATE 200 MG/1
25 TABLET, FILM COATED ORAL ONCE
Refills: 0 | Status: COMPLETED | OUTPATIENT
Start: 2023-01-01 | End: 2023-01-01

## 2023-01-01 RX ORDER — CHLORHEXIDINE GLUCONATE 213 G/1000ML
1 SOLUTION TOPICAL DAILY
Refills: 0 | Status: DISCONTINUED | OUTPATIENT
Start: 2023-01-01 | End: 2023-01-01

## 2023-01-01 RX ORDER — GABAPENTIN 400 MG/1
100 CAPSULE ORAL EVERY 8 HOURS
Refills: 0 | Status: DISCONTINUED | OUTPATIENT
Start: 2023-01-01 | End: 2023-01-01

## 2023-01-01 RX ORDER — HYDROMORPHONE HYDROCHLORIDE 2 MG/ML
0.2 INJECTION INTRAMUSCULAR; INTRAVENOUS; SUBCUTANEOUS EVERY 4 HOURS
Refills: 0 | Status: DISCONTINUED | OUTPATIENT
Start: 2023-01-01 | End: 2023-01-01

## 2023-01-01 RX ORDER — LEVOTHYROXINE SODIUM 125 MCG
25 TABLET ORAL DAILY
Refills: 0 | Status: DISCONTINUED | OUTPATIENT
Start: 2023-01-01 | End: 2023-01-01

## 2023-01-01 RX ORDER — ATENOLOL 25 MG/1
100 TABLET ORAL ONCE
Refills: 0 | Status: COMPLETED | OUTPATIENT
Start: 2023-01-01 | End: 2023-01-01

## 2023-01-01 RX ORDER — ONDANSETRON 8 MG/1
4 TABLET, FILM COATED ORAL EVERY 6 HOURS
Refills: 0 | Status: DISCONTINUED | OUTPATIENT
Start: 2023-01-01 | End: 2023-01-01

## 2023-01-01 RX ORDER — MORPHINE SULFATE 50 MG/1
2 CAPSULE, EXTENDED RELEASE ORAL ONCE
Refills: 0 | Status: DISCONTINUED | OUTPATIENT
Start: 2023-01-01 | End: 2023-01-01

## 2023-01-01 RX ORDER — MORPHINE SULFATE 50 MG/1
2 CAPSULE, EXTENDED RELEASE ORAL
Refills: 0 | Status: DISCONTINUED | OUTPATIENT
Start: 2023-01-01 | End: 2023-01-01

## 2023-01-01 RX ORDER — SODIUM CHLORIDE 9 MG/ML
250 INJECTION, SOLUTION INTRAVENOUS ONCE
Refills: 0 | Status: COMPLETED | OUTPATIENT
Start: 2023-01-01 | End: 2023-01-01

## 2023-01-01 RX ORDER — NICARDIPINE HYDROCHLORIDE 30 MG/1
5 CAPSULE, EXTENDED RELEASE ORAL
Qty: 40 | Refills: 0 | Status: DISCONTINUED | OUTPATIENT
Start: 2023-01-01 | End: 2023-01-01

## 2023-01-01 RX ORDER — HEPARIN SODIUM 5000 [USP'U]/ML
5000 INJECTION INTRAVENOUS; SUBCUTANEOUS EVERY 8 HOURS
Refills: 0 | Status: DISCONTINUED | OUTPATIENT
Start: 2023-01-01 | End: 2023-01-01

## 2023-01-01 RX ADMIN — MORPHINE SULFATE 2 MILLIGRAM(S): 50 CAPSULE, EXTENDED RELEASE ORAL at 20:45

## 2023-01-01 RX ADMIN — LIDOCAINE 2 PATCH: 4 CREAM TOPICAL at 01:00

## 2023-01-01 RX ADMIN — SODIUM CHLORIDE 250 MILLILITER(S): 9 INJECTION, SOLUTION INTRAVENOUS at 21:20

## 2023-01-01 RX ADMIN — SODIUM CHLORIDE 75 MILLILITER(S): 9 INJECTION, SOLUTION INTRAVENOUS at 09:05

## 2023-01-01 RX ADMIN — METHOCARBAMOL 500 MILLIGRAM(S): 500 TABLET, FILM COATED ORAL at 13:33

## 2023-01-01 RX ADMIN — Medication 260 MILLIGRAM(S): at 17:15

## 2023-01-01 RX ADMIN — HEPARIN SODIUM 5000 UNIT(S): 5000 INJECTION INTRAVENOUS; SUBCUTANEOUS at 14:19

## 2023-01-01 RX ADMIN — CHLORHEXIDINE GLUCONATE 1 APPLICATION(S): 213 SOLUTION TOPICAL at 11:15

## 2023-01-01 RX ADMIN — SODIUM CHLORIDE 30 MILLILITER(S): 9 INJECTION, SOLUTION INTRAVENOUS at 16:35

## 2023-01-01 RX ADMIN — HYDROMORPHONE HYDROCHLORIDE 0.2 MILLIGRAM(S): 2 INJECTION INTRAMUSCULAR; INTRAVENOUS; SUBCUTANEOUS at 17:33

## 2023-01-01 RX ADMIN — GABAPENTIN 300 MILLIGRAM(S): 400 CAPSULE ORAL at 21:25

## 2023-01-01 RX ADMIN — CHLORHEXIDINE GLUCONATE 1 APPLICATION(S): 213 SOLUTION TOPICAL at 12:36

## 2023-01-01 RX ADMIN — Medication 10 MILLIGRAM(S): at 11:39

## 2023-01-01 RX ADMIN — Medication 5 MILLIGRAM(S): at 21:25

## 2023-01-01 RX ADMIN — SODIUM CHLORIDE 250 MILLILITER(S): 9 INJECTION, SOLUTION INTRAVENOUS at 23:57

## 2023-01-01 RX ADMIN — AMLODIPINE BESYLATE 2.5 MILLIGRAM(S): 2.5 TABLET ORAL at 09:53

## 2023-01-01 RX ADMIN — QUETIAPINE FUMARATE 25 MILLIGRAM(S): 200 TABLET, FILM COATED ORAL at 21:30

## 2023-01-01 RX ADMIN — MORPHINE SULFATE 2 MG/HR: 50 CAPSULE, EXTENDED RELEASE ORAL at 07:23

## 2023-01-01 RX ADMIN — LIDOCAINE 2 PATCH: 4 CREAM TOPICAL at 13:13

## 2023-01-01 RX ADMIN — MORPHINE SULFATE 2 MG/HR: 50 CAPSULE, EXTENDED RELEASE ORAL at 19:49

## 2023-01-01 RX ADMIN — MORPHINE SULFATE 2 MG/HR: 50 CAPSULE, EXTENDED RELEASE ORAL at 14:14

## 2023-01-01 RX ADMIN — HYDROMORPHONE HYDROCHLORIDE 0.2 MILLIGRAM(S): 2 INJECTION INTRAMUSCULAR; INTRAVENOUS; SUBCUTANEOUS at 19:38

## 2023-01-01 RX ADMIN — MORPHINE SULFATE 2 MILLIGRAM(S): 50 CAPSULE, EXTENDED RELEASE ORAL at 20:30

## 2023-01-01 RX ADMIN — SODIUM CHLORIDE 75 MILLILITER(S): 9 INJECTION, SOLUTION INTRAVENOUS at 21:24

## 2023-01-01 RX ADMIN — ATENOLOL 100 MILLIGRAM(S): 25 TABLET ORAL at 09:53

## 2023-01-01 RX ADMIN — SODIUM CHLORIDE 125 MILLILITER(S): 9 INJECTION, SOLUTION INTRAVENOUS at 13:38

## 2023-01-01 RX ADMIN — ESCITALOPRAM OXALATE 10 MILLIGRAM(S): 10 TABLET, FILM COATED ORAL at 17:59

## 2023-01-01 RX ADMIN — Medication 0.5 MILLIGRAM(S): at 22:28

## 2023-01-01 RX ADMIN — Medication 650 MILLIGRAM(S): at 04:00

## 2023-01-01 RX ADMIN — SODIUM CHLORIDE 75 MILLILITER(S): 9 INJECTION, SOLUTION INTRAVENOUS at 13:35

## 2023-01-01 RX ADMIN — Medication 650 MILLIGRAM(S): at 11:16

## 2023-01-01 RX ADMIN — Medication 260 MILLIGRAM(S): at 15:32

## 2023-01-01 RX ADMIN — LIDOCAINE 2 PATCH: 4 CREAM TOPICAL at 00:13

## 2023-01-01 RX ADMIN — Medication 200 GRAM(S): at 02:56

## 2023-01-01 RX ADMIN — NICARDIPINE HYDROCHLORIDE 25 MG/HR: 30 CAPSULE, EXTENDED RELEASE ORAL at 11:30

## 2023-01-01 RX ADMIN — HEPARIN SODIUM 5000 UNIT(S): 5000 INJECTION INTRAVENOUS; SUBCUTANEOUS at 06:23

## 2023-01-01 RX ADMIN — SODIUM CHLORIDE 30 MILLILITER(S): 9 INJECTION, SOLUTION INTRAVENOUS at 20:50

## 2023-01-01 RX ADMIN — SODIUM CHLORIDE 1000 MILLILITER(S): 9 INJECTION INTRAMUSCULAR; INTRAVENOUS; SUBCUTANEOUS at 02:46

## 2023-01-01 RX ADMIN — Medication 650 MILLIGRAM(S): at 08:28

## 2023-01-01 RX ADMIN — Medication 650 MILLIGRAM(S): at 15:11

## 2023-01-01 RX ADMIN — Medication 650 MILLIGRAM(S): at 17:05

## 2023-01-01 RX ADMIN — OXYCODONE HYDROCHLORIDE 2.5 MILLIGRAM(S): 5 TABLET ORAL at 20:50

## 2023-01-01 RX ADMIN — HYDROMORPHONE HYDROCHLORIDE 0.2 MILLIGRAM(S): 2 INJECTION INTRAMUSCULAR; INTRAVENOUS; SUBCUTANEOUS at 15:38

## 2023-01-01 RX ADMIN — HEPARIN SODIUM 5000 UNIT(S): 5000 INJECTION INTRAVENOUS; SUBCUTANEOUS at 21:24

## 2023-01-01 RX ADMIN — Medication 650 MILLIGRAM(S): at 05:50

## 2023-01-01 RX ADMIN — MORPHINE SULFATE 2 MG/HR: 50 CAPSULE, EXTENDED RELEASE ORAL at 19:34

## 2023-01-01 RX ADMIN — METHOCARBAMOL 500 MILLIGRAM(S): 500 TABLET, FILM COATED ORAL at 21:25

## 2023-01-01 RX ADMIN — HEPARIN SODIUM 5000 UNIT(S): 5000 INJECTION INTRAVENOUS; SUBCUTANEOUS at 12:53

## 2023-01-01 RX ADMIN — MORPHINE SULFATE 2 MG/HR: 50 CAPSULE, EXTENDED RELEASE ORAL at 14:25

## 2023-01-01 RX ADMIN — MORPHINE SULFATE 2 MILLIGRAM(S): 50 CAPSULE, EXTENDED RELEASE ORAL at 20:01

## 2023-01-01 RX ADMIN — MORPHINE SULFATE 2 MG/HR: 50 CAPSULE, EXTENDED RELEASE ORAL at 08:00

## 2023-01-01 RX ADMIN — HEPARIN SODIUM 5000 UNIT(S): 5000 INJECTION INTRAVENOUS; SUBCUTANEOUS at 13:34

## 2023-01-01 RX ADMIN — LOSARTAN POTASSIUM 100 MILLIGRAM(S): 100 TABLET, FILM COATED ORAL at 09:53

## 2023-01-01 RX ADMIN — SIMVASTATIN 20 MILLIGRAM(S): 20 TABLET, FILM COATED ORAL at 22:10

## 2023-01-01 RX ADMIN — MORPHINE SULFATE 2 MILLIGRAM(S): 50 CAPSULE, EXTENDED RELEASE ORAL at 22:09

## 2023-01-01 RX ADMIN — GABAPENTIN 100 MILLIGRAM(S): 400 CAPSULE ORAL at 22:10

## 2023-01-01 RX ADMIN — MORPHINE SULFATE 2 MILLIGRAM(S): 50 CAPSULE, EXTENDED RELEASE ORAL at 21:56

## 2023-01-01 RX ADMIN — SODIUM CHLORIDE 84 MILLILITER(S): 9 INJECTION, SOLUTION INTRAVENOUS at 03:39

## 2023-01-01 RX ADMIN — HYDROMORPHONE HYDROCHLORIDE 0.2 MILLIGRAM(S): 2 INJECTION INTRAMUSCULAR; INTRAVENOUS; SUBCUTANEOUS at 19:23

## 2023-01-01 RX ADMIN — HYDROMORPHONE HYDROCHLORIDE 0.2 MILLIGRAM(S): 2 INJECTION INTRAMUSCULAR; INTRAVENOUS; SUBCUTANEOUS at 17:15

## 2023-01-01 RX ADMIN — Medication 650 MILLIGRAM(S): at 16:00

## 2023-01-01 RX ADMIN — HEPARIN SODIUM 5000 UNIT(S): 5000 INJECTION INTRAVENOUS; SUBCUTANEOUS at 22:10

## 2023-01-01 RX ADMIN — ONDANSETRON 4 MILLIGRAM(S): 8 TABLET, FILM COATED ORAL at 13:00

## 2023-01-01 RX ADMIN — Medication 260 MILLIGRAM(S): at 11:08

## 2023-01-01 RX ADMIN — HYDROMORPHONE HYDROCHLORIDE 0.2 MILLIGRAM(S): 2 INJECTION INTRAMUSCULAR; INTRAVENOUS; SUBCUTANEOUS at 00:15

## 2023-01-01 RX ADMIN — LIDOCAINE 2 PATCH: 4 CREAM TOPICAL at 16:16

## 2023-01-01 RX ADMIN — TETANUS TOXOID, REDUCED DIPHTHERIA TOXOID AND ACELLULAR PERTUSSIS VACCINE, ADSORBED 0.5 MILLILITER(S): 5; 2.5; 8; 8; 2.5 SUSPENSION INTRAMUSCULAR at 08:28

## 2023-01-01 RX ADMIN — OXYCODONE HYDROCHLORIDE 2.5 MILLIGRAM(S): 5 TABLET ORAL at 21:49

## 2023-01-01 RX ADMIN — GABAPENTIN 300 MILLIGRAM(S): 400 CAPSULE ORAL at 13:33

## 2023-01-01 RX ADMIN — Medication 260 MILLIGRAM(S): at 16:35

## 2023-01-01 RX ADMIN — Medication 650 MILLIGRAM(S): at 09:53

## 2023-01-01 RX ADMIN — SIMVASTATIN 20 MILLIGRAM(S): 20 TABLET, FILM COATED ORAL at 21:24

## 2023-01-01 RX ADMIN — Medication 10 MILLIGRAM(S): at 12:12

## 2023-01-01 RX ADMIN — LIDOCAINE 2 PATCH: 4 CREAM TOPICAL at 02:00

## 2023-01-01 RX ADMIN — LIDOCAINE 2 PATCH: 4 CREAM TOPICAL at 19:40

## 2023-01-01 RX ADMIN — METHOCARBAMOL 500 MILLIGRAM(S): 500 TABLET, FILM COATED ORAL at 22:09

## 2023-01-01 RX ADMIN — HYDROMORPHONE HYDROCHLORIDE 0.2 MILLIGRAM(S): 2 INJECTION INTRAMUSCULAR; INTRAVENOUS; SUBCUTANEOUS at 23:59

## 2023-01-01 RX ADMIN — Medication 260 MILLIGRAM(S): at 03:36

## 2023-01-01 RX ADMIN — Medication 260 MILLIGRAM(S): at 12:44

## 2023-01-01 RX ADMIN — HYDROMORPHONE HYDROCHLORIDE 0.2 MILLIGRAM(S): 2 INJECTION INTRAMUSCULAR; INTRAVENOUS; SUBCUTANEOUS at 15:53

## 2023-01-01 RX ADMIN — SODIUM CHLORIDE 30 MILLILITER(S): 9 INJECTION, SOLUTION INTRAVENOUS at 11:14

## 2023-01-01 RX ADMIN — HEPARIN SODIUM 5000 UNIT(S): 5000 INJECTION INTRAVENOUS; SUBCUTANEOUS at 05:40

## 2023-01-01 RX ADMIN — MORPHINE SULFATE 2 MILLIGRAM(S): 50 CAPSULE, EXTENDED RELEASE ORAL at 19:46

## 2023-01-01 RX ADMIN — Medication 260 MILLIGRAM(S): at 05:48

## 2023-01-01 RX ADMIN — LIDOCAINE 2 PATCH: 4 CREAM TOPICAL at 13:33

## 2023-01-01 RX ADMIN — Medication 200 GRAM(S): at 11:14

## 2023-01-01 RX ADMIN — LIDOCAINE 2 PATCH: 4 CREAM TOPICAL at 14:19

## 2023-01-01 RX ADMIN — Medication 650 MILLIGRAM(S): at 10:53

## 2023-01-01 RX ADMIN — LIDOCAINE 2 PATCH: 4 CREAM TOPICAL at 19:33

## 2023-01-01 RX ADMIN — LIDOCAINE 2 PATCH: 4 CREAM TOPICAL at 13:17

## 2023-01-01 RX ADMIN — Medication 5 MILLIGRAM(S): at 22:10

## 2023-01-01 RX ADMIN — LIDOCAINE 2 PATCH: 4 CREAM TOPICAL at 12:52

## 2023-01-09 ENCOUNTER — OFFICE (OUTPATIENT)
Dept: URBAN - METROPOLITAN AREA CLINIC 105 | Facility: CLINIC | Age: 88
Setting detail: OPHTHALMOLOGY
End: 2023-01-09
Payer: MEDICARE

## 2023-01-09 DIAGNOSIS — H35.3231: ICD-10-CM

## 2023-01-09 DIAGNOSIS — H35.3211: ICD-10-CM

## 2023-01-09 PROCEDURE — 92134 CPTRZ OPH DX IMG PST SGM RTA: CPT | Performed by: OPHTHALMOLOGY

## 2023-01-09 PROCEDURE — 67028 INJECTION EYE DRUG: CPT | Performed by: OPHTHALMOLOGY

## 2023-01-09 ASSESSMENT — VISUAL ACUITY
OD_BCVA: CF@1FT
OS_BCVA: 20/70-1

## 2023-01-09 ASSESSMENT — AXIALLENGTH_DERIVED
OD_AL: 23.0363
OS_AL: 23.0908

## 2023-01-09 ASSESSMENT — REFRACTION_AUTOREFRACTION
OD_SPHERE: -0.25
OS_SPHERE: -1.00
OS_AXIS: 092
OD_CYLINDER: -2.00
OD_AXIS: 087
OS_CYLINDER: -1.50

## 2023-01-09 ASSESSMENT — KERATOMETRY
OD_K1POWER_DIOPTERS: 45.75
OD_AXISANGLE_DEGREES: 165
OS_K2POWER_DIOPTERS: 47.25
OD_K2POWER_DIOPTERS: 47.00
OS_K1POWER_DIOPTERS: 46.25
OS_AXISANGLE_DEGREES: 171

## 2023-01-09 ASSESSMENT — SPHEQUIV_DERIVED
OS_SPHEQUIV: -1.75
OD_SPHEQUIV: -1.25

## 2023-01-09 ASSESSMENT — CONFRONTATIONAL VISUAL FIELD TEST (CVF)
OD_FINDINGS: FULL
OS_FINDINGS: FULL

## 2023-02-08 NOTE — ED ADULT NURSE NOTE - CHIEF COMPLAINT QUOTE
pt brought in by daughter for fall from the [facility] assisted living , pt had a witnessed fall, hit head , no LOC, no blood thinners, denies HA, hx HTN,

## 2023-02-08 NOTE — ED ADULT NURSE NOTE - OBJECTIVE STATEMENT
Pt with witnessed trip and fall at her assisted living and states she has no injuries form the fall but they wanted to have her evaluated anyway. No injuries noted and pt had priority CT to r/o any intercranial findings.

## 2023-02-08 NOTE — ED ADULT TRIAGE NOTE - CHIEF COMPLAINT QUOTE
pt brought in by daughter for fall from the [facility] assisted living , pt had a witnessed fall, hit head , no LOC, no blood thinners, denies HA, hx HTN,

## 2023-02-08 NOTE — ED PROVIDER NOTE - CLINICAL SUMMARY MEDICAL DECISION MAKING FREE TEXT BOX
diagnostic imaging results reviewed with patient and family; patient feels comfortable with discharge and medical plan; PMD or clinic follow up recommended for reassessment. Patient is aware of signs/symptoms to return to the emergency department.

## 2023-02-08 NOTE — ED PROVIDER NOTE - PATIENT PORTAL LINK FT
You can access the FollowMyHealth Patient Portal offered by [facility] by registering at the following website: [email or web address]. By joining [employer]’s FollowMyHealth portal, you will also be able to view your health information using other applications (apps) compatible with our system.

## 2023-02-19 PROBLEM — F32.9 MAJOR DEPRESSIVE DISORDER, SINGLE EPISODE, UNSPECIFIED: Chronic | Status: ACTIVE | Noted: 2023-01-01

## 2023-02-19 PROBLEM — F03.90 UNSPECIFIED DEMENTIA, UNSPECIFIED SEVERITY, WITHOUT BEHAVIORAL DISTURBANCE, PSYCHOTIC DISTURBANCE, MOOD DISTURBANCE, AND ANXIETY: Chronic | Status: ACTIVE | Noted: 2023-01-01

## 2023-02-19 NOTE — ED ADULT TRIAGE NOTE - CHIEF COMPLAINT QUOTE
PT BIBA from [facility] s/p mechanical slip and fall this morning while making bed.  Denies LOC, headache, dizziness. No blood thinners. hx HTN did not take meds this morning. Bleeding controlled

## 2023-02-19 NOTE — ED PROVIDER NOTE - NSFOLLOWUPINSTRUCTIONS_ED_ALL_ED_FT
Laceration    A laceration is a cut that goes through all of the layers of the skin and into the tissue that is right under the skin. Some lacerations heal on their own. Others need to be closed with skin adhesive strips, skin glue, stitches (sutures), or staples. Proper laceration care minimizes the risk of infection and helps the laceration to heal better.  If non-absorbable stitches or staples have been placed, they must be taken out within the time frame instructed by your healthcare provider.    SEEK IMMEDIATE MEDICAL CARE IF YOU HAVE ANY OF THE FOLLOWING SYMPTOMS: swelling around the wound, worsening pain, drainage from the wound, red streaking going away from your wound, inability to move finger or toe near the laceration, or discoloration of skin near the laceration.     Head Injury   There are many types of head injuries. They can be as minor as a bump. Some head injuries can be worse. Worse injuries include:    A strong hit to the head that hurts the brain (concussion).  A bruise of the brain (contusion). This means there is bleeding in the brain that can cause swelling.  A cracked skull (skull fracture).  Bleeding in the brain that gathers, gets thick (makes a clot), and forms a bump (hematoma).    Most problems from a head injury come in the first 24 hours. However, you may still have side effects up to 7–10 days after the injury. It is important to watch your  condition for any changes.    Follow these instructions at home:  Medicines     Give over-the-counter and prescription medicines only as told by your  doctor.    Activity     Rest as much as possible. Rest helps the brain heal.  Avoid activities that are hard or tiring.    Make sure you get enough sleep.  Limit activities that need a lot of thought or attention, such as:  Watching TV.  Playing memory games and puzzles.  Doing homework.  Working on the computer, social media, and texting.    Keep from activities that could cause another head injury, such as:    Riding a bicycle.  Playing sports.  Playing in gym class or recess.  Climbing on a playground.    Ask your doctor when it is safe for you  to return to normal activities.    General instructions     Watch for symptoms that are new or getting worse. This is very important in the first 24 hours after the head injury.  Keep all follow-up visits as told by your doctor. This is important.    How is this prevented?    Wear a seatbelt when in a moving vehicle.  For Children: Use the right-sized car seat or booster seat when in a moving vehicle.  Wear a helmet when:    Riding a bicycle.  Skiing.  Doing any other sport or activity that has a risk of injury.      You can:    Make your home safer for your child.  Childproof any dangerous parts of your home.  Install window guards and safety palmer.  Make sure the playground that your child uses is safe.    Get help right away if you:    A very bad (severe) headache that is not helped by medicine.  Clear or bloody fluid coming from nose or ears.  Changes in seeing (vision).  Jerky movements you cannot control (seizure).  symptoms get worse.  throws up (vomits).  dizziness gets worse.  cannot walk or does not have control over arms or legs.  passes out.    For children:  You cannot wake up your child.  Your child is sleepier and has trouble staying awake.  Your child will not eat or nurse.  The black centers of your child's eyes (pupils) change in size.    These symptoms may be an emergency. Do not wait to see if the symptoms will go away. Get medical help right away. Call your local emergency services (911 in the [place]

## 2023-02-19 NOTE — ED PROVIDER NOTE - PATIENT PORTAL LINK FT
You can access the FollowMyHealth Patient Portal offered by [facility] by registering at the following website: [email or web address]. By joining [employer]’s FollowMyHealth portal, you will also be able to view your health information using other applications (apps) compatible with our system.

## 2023-02-19 NOTE — ED PROVIDER NOTE - PHYSICAL EXAMINATION
General: NAD, well appearing  HEENT: Normocephalic, atraumatic  Neck: No apparent stiffness or JVD  Pulm: Chest wall symmetric and nontender, lungs clear to ascultation   Cardiac: Regular rate and regular rhythm  Abdomen: Nontender and nondistended  Skin: 6cm scalp lac  Neuro: No motor or sensory deficits, ambularoy with walker  MSK: No deformity or tenderness

## 2023-02-19 NOTE — ED PROVIDER NOTE - NS ED ROS FT
General: No fever, no massive bleeding  Head: No HA, no ongoing scalp bleed  ENT: no neck pain, no sore throat  Resp: No SOB, no hemoptysis  Cardiovascular: No CP, No LOC  GI: No abdominal pain, No blood in stool  : No dysuria, no hematuria   MSK: No back pain, no limb pain  Skin: scalp lac  Neuro: No paresthesias, No focal deficits

## 2023-02-19 NOTE — ED ADULT NURSE REASSESSMENT NOTE - NS ED NURSE REASSESS COMMENT FT1
pt son verbalized understanding of discharge instructions, need for followup with (PMD and/or specialist)without fail.  Patient also provided with signs and symptoms to return to the emergency department immediately if they present.  Patient at baseline mental status , resps even and nonlabored, patient in no apparent distress upon discharge.

## 2023-02-19 NOTE — ED ADULT NURSE NOTE - OBJECTIVE STATEMENT
pt a+ox3, presents to ED s/p second fall in past week. pt states "the floor is so damn slippery". dressing in place, bleeding controlled. pt denies dizziness, lightheadedness, n/v.  MD @ bedside.

## 2023-02-19 NOTE — ED PROVIDER NOTE - CLINICAL SUMMARY MEDICAL DECISION MAKING FREE TEXT BOX
89F hx  HTN, hypothyroid presents from nursing home after mechanical fall.   Lac repaired, CT head and pelvis performed. 89F hx  HTN, hypothyroid presents from nursing home after mechanical fall.   Lac repaired, CT head and pelvis performed. Workup is unremarkable for any emergent process.   Patient is now feeling improved and wishes to leave.  Patient / family members have capacity.  Patient is ambulatory at baseline with walker  Patient/family was given full return precautions, counseled on red flag symptoms such as LOC, fever, severe pain, or focal deficits and advised to return to the ED for these reasons or any reason that was concerning to them. Patient/family was informed of all significant and incidental findings found on this workup today and all results were reviewed.   All questions were answered, advised to make close follow up with their primary care provider and specialty clinics (as applicable) to follow up with this visit and continue investigation/treatment.   Patient/family has shown adequate understanding and is agreeable to the plan.

## 2023-02-19 NOTE — ED PROVIDER NOTE - OBJECTIVE STATEMENT
89F hx  HTN, hypothyroid presents from nursing home after mechanical fall.  Patient was making the bed and tripped on the carpet.  She landed on her head and has a scalp laceration but denies HA or LOC.  Otherwise denies pain, bleeding, SOB, chest pain, abdominal pain or fevers.  Denies other pertinent medical problems.

## 2023-02-19 NOTE — ED ADULT NURSE NOTE - NSIMPLEMENTINTERV_GEN_ALL_ED
Implemented All Fall with Harm Risk Interventions:  [place] to call system. Call bell, personal items and telephone within reach. Instruct patient to call for assistance. Room bathroom lighting operational. Non-slip footwear when patient is off stretcher. Physically safe environment: no spills, clutter or unnecessary equipment. Stretcher in lowest position, wheels locked, appropriate side rails in place. Provide visual cue, wrist band, yellow gown, etc. Monitor gait and stability. Monitor for mental status changes and reorient to person, place, and time. Review medications for side effects contributing to fall risk. Reinforce activity limits and safety measures with patient and family. Provide visual clues: red socks.

## 2023-02-19 NOTE — ED PROVIDER NOTE - ATTENDING CONTRIBUTION TO CARE
May CORBETT- 89 Y/O F with h/o htn, hypothyroid bibems from [place] assisted liv[facility] where she slipped in the kitchen and fell and hit her head but no loc and was able to get up with staff help. Pt uses walker at baseline and is very wobbly. Pt feels fine now, no headache, nausea, vomiting. Pt has mild left groin pain. Son at bedside, pt not on any blood thinners.    Pt is alert, thin built, elderly , well appearing female, s1s2 normal reg, b/l clear breath sounds, abd soft, nt, nd, neuro exam aox3, cn 2-12 intact, no focal deficits, skin warm, dry, good turgor, 6 cm ant scalp laceration , unable to lifet left leg but no pelvic tenderness, pelvis stable, no midline tenderness of spine or neck    plan to do ct head, neck, pelvis, will supplement tdap, repair laceration and consider PT eval for safe discharge back for ambulation if all ct are negative and no evidence of inury

## 2023-02-19 NOTE — ED ADULT NURSE NOTE - CHIEF COMPLAINT QUOTE
PT BIBA from [facility] s/p mechanical slip and fall this morning while making bed.  Denies LOC, headache, dizziness. No blood thinners. hx HTN did not take meds this morning. Bleeding controlled

## 2023-03-13 ENCOUNTER — OFFICE (OUTPATIENT)
Dept: URBAN - METROPOLITAN AREA CLINIC 105 | Facility: CLINIC | Age: 88
Setting detail: OPHTHALMOLOGY
End: 2023-03-13
Payer: MEDICARE

## 2023-03-13 DIAGNOSIS — H35.3211: ICD-10-CM

## 2023-03-13 DIAGNOSIS — H35.3221: ICD-10-CM

## 2023-03-13 DIAGNOSIS — H35.3231: ICD-10-CM

## 2023-03-13 PROCEDURE — 67028 INJECTION EYE DRUG: CPT | Performed by: OPHTHALMOLOGY

## 2023-03-13 PROCEDURE — 92134 CPTRZ OPH DX IMG PST SGM RTA: CPT | Performed by: OPHTHALMOLOGY

## 2023-03-13 ASSESSMENT — SPHEQUIV_DERIVED
OS_SPHEQUIV: -1.75
OD_SPHEQUIV: -1.25

## 2023-03-13 ASSESSMENT — REFRACTION_AUTOREFRACTION
OD_CYLINDER: -2.00
OS_SPHERE: -1.00
OD_SPHERE: -0.25
OD_AXIS: 087
OS_CYLINDER: -1.50
OS_AXIS: 092

## 2023-03-13 ASSESSMENT — AXIALLENGTH_DERIVED
OS_AL: 23.0908
OD_AL: 23.0363

## 2023-03-13 ASSESSMENT — KERATOMETRY
OD_K2POWER_DIOPTERS: 47.00
OS_AXISANGLE_DEGREES: 171
OS_K2POWER_DIOPTERS: 47.25
OD_AXISANGLE_DEGREES: 165
OS_K1POWER_DIOPTERS: 46.25
OD_K1POWER_DIOPTERS: 45.75

## 2023-03-13 ASSESSMENT — VISUAL ACUITY
OS_BCVA: 20/60-1
OD_BCVA: CF@1FT

## 2023-03-13 ASSESSMENT — CONFRONTATIONAL VISUAL FIELD TEST (CVF)
OD_FINDINGS: FULL
OS_FINDINGS: FULL

## 2023-05-22 ENCOUNTER — OFFICE (OUTPATIENT)
Dept: URBAN - METROPOLITAN AREA CLINIC 105 | Facility: CLINIC | Age: 88
Setting detail: OPHTHALMOLOGY
End: 2023-05-22
Payer: MEDICARE

## 2023-05-22 DIAGNOSIS — H35.3113: ICD-10-CM

## 2023-05-22 DIAGNOSIS — H35.3231: ICD-10-CM

## 2023-05-22 DIAGNOSIS — H35.3123: ICD-10-CM

## 2023-05-22 DIAGNOSIS — H35.3211: ICD-10-CM

## 2023-05-22 PROCEDURE — 67028 INJECTION EYE DRUG: CPT | Performed by: OPHTHALMOLOGY

## 2023-05-22 PROCEDURE — 92134 CPTRZ OPH DX IMG PST SGM RTA: CPT | Performed by: OPHTHALMOLOGY

## 2023-05-22 ASSESSMENT — SPHEQUIV_DERIVED
OD_SPHEQUIV: -1.25
OS_SPHEQUIV: -1.75

## 2023-05-22 ASSESSMENT — REFRACTION_AUTOREFRACTION
OS_AXIS: 092
OD_AXIS: 087
OS_CYLINDER: -1.50
OD_CYLINDER: -2.00
OD_SPHERE: -0.25
OS_SPHERE: -1.00

## 2023-05-22 ASSESSMENT — KERATOMETRY
OD_AXISANGLE_DEGREES: 165
OS_AXISANGLE_DEGREES: 171
OD_K2POWER_DIOPTERS: 47.00
OS_K1POWER_DIOPTERS: 46.25
OD_K1POWER_DIOPTERS: 45.75
OS_K2POWER_DIOPTERS: 47.25

## 2023-05-22 ASSESSMENT — AXIALLENGTH_DERIVED
OS_AL: 23.0908
OD_AL: 23.0363

## 2023-05-22 ASSESSMENT — VISUAL ACUITY
OS_BCVA: 20/100-1
OD_BCVA: CF 1FT

## 2023-05-22 ASSESSMENT — CONFRONTATIONAL VISUAL FIELD TEST (CVF)
OD_FINDINGS: FULL
OS_FINDINGS: FULL

## 2023-06-16 NOTE — ED ADULT NURSE REASSESSMENT NOTE - NS ED NURSE REASSESS COMMENT FT1
Pt received from Jennifer CARDOZA, pts reason for visit, reason for transfer to [facility], current tests performed, tests awaiting to be performed, and plan of care reviewed, pt is not in any distress at this time, pt and daughter Starr at the bedside were educated, pt and daughter state understanding and proficiency determined successful after teach back shows proficiency.

## 2023-06-16 NOTE — H&P ADULT - NSHPPHYSICALEXAM_GEN_ALL_CORE
PHYSICAL EXAM:   General: NAD, Lying in bed comfortably  Neuro: A+Ox3  HEENT: NC/AT, EOMI  Neck: Soft, supple  Cardio: RRR, nml S1/S2  Resp: Good effort, CTA b/l  Thorax: R posterolateral chest wall tenderness, no crepitus.   Breast: No lesions/masses, no drainage  GI/Abd: Soft, NT/ND, no rebound/guarding, no masses palpated  Vascular: All 4 extremities warm.  Skin: R leg 5x5 open wound without active bleeding.   Lymphatic/Nodes: No palpable lymphadenopathy  Pelvis: stable  Musculoskeletal: All 4 extremities moving spontaneously, no limitations, no spinal tenderness or stepoffs

## 2023-06-16 NOTE — CONSULT NOTE ADULT - ASSESSMENT
89y Female with PMH depression, dementia, and HTN.  Resides in assisted living and has a 24 hours aid.  She is wheelchair bound and can use a walker with assistance.  Presented to the ER this morning after falling at home in the bathroom.  CT chest non contrast showed right rib fractures 7-10, as well as an incidental finding of a type b dissection.  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 Age-indeterminate associated penetrating atherosclerotic ulcer at the same level.  She was hypertensive upon arrival with an sbp in the 230's.  She is currently on a cardene gtt.   Called to evaluate for type B dissection.

## 2023-06-16 NOTE — CONSULT NOTE ADULT - ASSESSMENT
89F  < from: CT Chest No Cont (06.16.23 @ 09:42) >  BONES: Nondisplaced right lateral seventh rib fracture. Mildly displaced   right lateral eighth rib fracture and mildly displaced comminuted right   posterior eighth rib fracture (flail chest). Mildly displaced right   posterior lateral ninth rib fracture. Nondisplaced right posterior 10th   rib fracture. Old left superior and inferior pubic ramus fractures.      Patient interested in a nerve block procedure as an addition to their current analgesic therapy.   The patient has not experienced satisfactory relief from other treatment modalities including analgesic pain medications.  The risks, benefits and alternatives of a nerve block were discussed with the patient.  The benefits include hopeful relief of pain.  The alternatives include avoiding the procedure and continuing treatment with non-medication therapies and medications, including increasing and/or changing current analgesic medications.  The patient understands that this is an invasive procedure and therefore there are inherent risks. The patient was informed of the risks of the procedure including but not limited to infection, bleeding, injury to nearby tissues, permanent nerve injury, stroke, no decrease in pain or worsened pain, and toxicity from local anesthetic medications.   No certain guarantees have been made and patient understands that responses can vary and repeat procedures may be necessary.

## 2023-06-16 NOTE — ED PROVIDER NOTE - NS ED ROS FT
Constitutional: no fever, no chills  Head: NC, AT  Eyes: no redness   ENMT: no nasal congestion/drainage, no sore throat   CV: no chest pain, no edema  Resp: no cough, no dyspnea  GI: no abdominal pain, no nausea, no vomiting  : no dysuria  Skin: + lesions, no rashes   Neuro: no LOC, no headache, no sensory deficits

## 2023-06-16 NOTE — ED ADULT TRIAGE NOTE - WEIGHT IN KG
44 Pt with a hx of anxiety on trazadone at home. Also reports taking clonazepam PRN  -c/w home trazadone

## 2023-06-16 NOTE — ED ADULT TRIAGE NOTE - CHIEF COMPLAINT QUOTE
" I fell in the bathroom when I was getting ready to go to breakfast" pt denies LOC, states she is unsure if she hit her head, denies blood thinners. pt co pain to right side radiating to right leg. pt AOX2 has hx of dementia. Fall was unwitnessed, coming from [facility]

## 2023-06-16 NOTE — ED PROVIDER NOTE - PHYSICAL EXAMINATION
General: NAD  Head: NC, AT  EENT: no scleral icterus  Cardiac: RRR, no apparent murmurs, no lower extremity edema  Respiratory: CTABL, no respiratory distress  Abdomen: soft, ND, R flank ttp, nonperitonitic  MSK/Vascular: soft compartments, warm extremities  Neuro: sensation to light touch intact  Psych: calm, cooperative

## 2023-06-16 NOTE — H&P ADULT - ATTENDING COMMENTS
89 yo F with HTN presenting to the ED after mechanical fall sustaining 4 R rib fractures with 2 displaced and 1 broken in 2 segments, In addition patient found to be on HTN emergency with SBP of 220 with evidence of Type B dissection on CT non-con of chest abdomen and pelvis, now with HR in the 50s and SBP 170s, getting CTA of the chest and neck.  Awake alert  Bilateral BS  No chest or back pain  Hemodynamic intact, but hypertensive, on multiple medications  Abdomen soft, active BS. 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 There is no indication for surgical intervention, for this is limited dissection to be managed with B blockers and decrease of afterload / vascular stress    Admit to trauma  SICU level of care  CT surgery consult  Strict BP control  PIC protocol  Intense pulm toilet  PT evaluation 89 yo F with HTN presenting to the ED after mechanical fall sustaining 4 R rib fractures with 2 displaced and 1 broken in 2 segments, In addition patient found to be on HTN emergency with SBP of 220 with evidence of Type B dissection on CT non-con of chest abdomen and pelvis, now with HR in the 50s and SBP 170s, getting CTA of the chest and neck.  Awake alert  Bilateral BS  No chest or back pain  Hemodynamic intact, but hypertensive, on multiple medications  Abdomen soft, active BS. 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 Limited to thoracic aorta and ends at hiatus diaphragmae. There is no indication for surgical intervention, for this is limited dissection, to be managed with B blockers and decrease of afterload / vascular stress    Admit to trauma  SICU level of care  CT surgery consult  Strict BP control  PIC protocol  Intense pulm toilet  PT evaluation 89 yo F with HTN presenting to the ED after mechanical fall sustaining 4 R rib fractures with 2 displaced and 1 broken in 2 segments, In addition patient found to be on HTN emergency with SBP of 220 with evidence of Type B dissection on CT non-con of chest abdomen and pelvis, now with HR in the 50s and SBP 170s, getting CTA of the chest and neck.  Awake alert  Bilateral BS. Tender over the R chest consistent with fxs of 7-10 rib  No chest or back pain  Hemodynamic intact, but hypertensive, on multiple medications  Abdomen soft, active BS. 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 Limited to thoracic aorta and ends at hiatus diaphragmae. There is no indication for surgical intervention, for this is limited dissection, to be managed with B blockers and decrease of afterload / vascular stress    Admit to trauma  SICU level of care  CT surgery consult  Strict BP control  PIC protocol  Intense pulm toilet  PT evaluation

## 2023-06-16 NOTE — CONSULT NOTE ADULT - SUBJECTIVE AND OBJECTIVE BOX
Patient is a 89y old  Female who presents with a chief complaint of R rib fx 7-10  Type B aortic dissection  RLE open wound (16 Jun 2023 13:04)      HPI:  89 yo F with PMH of HTN presents to the ED after falling in her bathroom on her right side. Patient had no LOC and is not on anticoagulation. Lives at assisted living facility and is able to ambulate with walker, however, dos not like to use it.     A: Intact, speaking in full sentences  B: Bilateral breath sounds  C: Palpable central and peripheral pulses, no evidence of active bleeding.  D: GCS 15, no evident deficits  E: Patient exposed, RLE open wound at mid leg.  (16 Jun 2023 11:14)        Interval Hx:  Patient seen during rounds  Patient reports pain to be mod controlled on current medications  Patient denies sedation with medications   Discussed nerve block procedure as an addition to their current analgesic therapy.         Analgesic Dosing for past 24 hours reviewed as below:    acetaminophen     Tablet ..   650 milliGRAM(s) Oral (06-16-23 @ 09:53)    acetaminophen   IVPB ..   260 mL/Hr IV Intermittent (06-16-23 @ 15:32)    gabapentin   300 milliGRAM(s) Oral (06-16-23 @ 13:33)    methocarbamol   500 milliGRAM(s) Oral (06-16-23 @ 13:33)    ondansetron Injectable   4 milliGRAM(s) IV Push (06-16-23 @ 13:00)          T(C): 36.7 (06-16-23 @ 14:00), Max: 36.9 (06-16-23 @ 08:25)  HR: 54 (06-16-23 @ 14:45) (48 - 75)  BP: 135/48 (06-16-23 @ 14:00) (110/52 - 232/73)  RR: 21 (06-16-23 @ 14:45) (15 - 24)  SpO2: 93% (06-16-23 @ 14:45) (93% - 100%)        acetaminophen     Tablet .. 650 milliGRAM(s) Oral every 6 hours  gabapentin 300 milliGRAM(s) Oral every 8 hours  heparin   Injectable 5000 Unit(s) SubCutaneous every 8 hours  lidocaine   4% Patch 2 Patch Transdermal every 24 hours  methocarbamol 500 milliGRAM(s) Oral every 8 hours  multiple electrolytes Injection Type 1 1000 milliLiter(s) IV Continuous <Continuous>  niCARdipine Infusion 5 mG/Hr IV Continuous <Continuous>                          8.1    17.80 )-----------( 245      ( 16 Jun 2023 12:15 )             25.6     06-16    138  |  101  |  29.0<H>  ----------------------------<  110<H>  4.8   |  25.0  |  1.45<H>    Ca    9.2      16 Jun 2023 10:42    TPro  6.6  /  Alb  3.6  /  TBili  0.4  /  DBili  x   /  AST  21  /  ALT  16  /  AlkPhos  87  06-16    PT/INR - ( 16 Jun 2023 10:42 )   PT: 11.9 sec;   INR: 1.03 ratio         PTT - ( 16 Jun 2023 10:42 )  PTT:28.4 sec      Pain Service   [phone]

## 2023-06-16 NOTE — ED PROVIDER NOTE - ATTENDING CONTRIBUTION TO CARE
I, Meka Rodríguez, have personally seen and examined this patient. I have fully participated in the care of this patient. I have reviewed all pertinent clinical information, including history, physical exam, plan and the Resident's note and agree except as noted below.     patient critically ill requiring medications with intensive monitoring, discussion with consultants, discussion with patient and family, interpretation of diagnostic studies.           89-year-old female with mechanical slip and fall in the bathroom today initial complaint of right-sided chest and wall and flank pain denies LOC but patient is unsure does note wound to her right leg.  Not on blood thinners unknown last tetanus.  No abdominal pain.  No headaches.  Exam no acute distress decreased breath sounds on the right no respiratory distress positive posterior chest wall tenderness right rib cage.  Abdomen soft nontender pelvis stable.  Large avulsion wound to the right anterior shin approximately 10 x 6 cm with exposure of subcutaneous tissue.  No active bleeding.    CT none, multiple rib fractures hemopneumo trauma consulted also questionable dissection flap.  Sent for urgent CTA was given home p.o. 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

## 2023-06-16 NOTE — H&P ADULT - ASSESSMENT
89 yo F with HTN presenting to the ED after mechanical fall sustaining 4 R rib fractures with 2 displaced and 1 broken in 2 segments, 89 yo F with HTN presenting to the ED after mechanical fall sustaining 4 R rib fractures with 2 displaced and 1 broken in 2 segments, In addition patient found to be on HTN emergency with SBP of 223 and repeat 232 with evidence of Type B dissection on CT non-con of chest abdomen and pelvis, now with HR in the 50s and SBP 170s, getting CTA of the chest and neck. Patient had no transfixatiing chest pain.    Admit to trauma  SICU level of care  CT surgery consult  Strict BP control  PIC protocol  Intense pulm toilet  PT evaluation

## 2023-06-16 NOTE — PATIENT PROFILE ADULT - FALL HARM RISK - HARM RISK INTERVENTIONS

## 2023-06-16 NOTE — CONSULT NOTE ADULT - SUBJECTIVE AND OBJECTIVE BOX
Surgeon: Giovanni    Consult requesting by: Ed    HISTORY OF PRESENT ILLNESS:  89y Female with PMH depression, dementia, and HTN.  Resides in assisted living and has a 24 hours aid.  She is wheelchair bound and can use a walker with assistance.  Presented to the ER this morning after falling at home in the bathroom.  CT chest non contrast showed right rib fractures 7-10, as well as an incidental finding of a type b dissection.  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 Age-indeterminate associated penetrating atherosclerotic ulcer at the same level.  She was hypertensive upon arrival with an sbp in the 230's.  She is currently on a cardene gtt.   Called to evaluate for type B dissection.    Pt lying in bed.  Denies CP.   Reports nausea and pain to her right side on deep inspiration.  Family at bedside.  NAD noted.     PAST MEDICAL & SURGICAL HISTORY:  Major depression      Dementia      Hypertension      MEDICATIONS  (STANDING):  acetaminophen     Tablet .. 650 milliGRAM(s) Oral every 6 hours  acetaminophen   IVPB .. 650 milliGRAM(s) IV Intermittent once  gabapentin 300 milliGRAM(s) Oral every 8 hours  heparin   Injectable 5000 Unit(s) SubCutaneous every 8 hours  lidocaine   4% Patch 2 Patch Transdermal every 24 hours  methocarbamol 500 milliGRAM(s) Oral every 8 hours  multiple electrolytes Injection Type 1 1000 milliLiter(s) (75 mL/Hr) IV Continuous <Continuous>  niCARdipine Infusion 5 mG/Hr (25 mL/Hr) IV Continuous <Continuous>    MEDICATIONS  (PRN):    Antiplatelet therapy:  none                         Last dose/amt:    Allergies    penicillin (Unknown)    Intolerances        SOCIAL HISTORY:  Smoker: [x ] Yes  [ ] No   Remote history of smoking  quit 35-40 years ago per family .       ETOH use: [ ] Yes  [ x] No              FREQUENCY / QUANTITY:  [place] Drug use:  [ ] Yes  [ x] No  Occupation: none  Live with: in assisted living facility  Assisted device use:  wheelchair bound but can use walker with assistance.    FAMILY HISTORY:  No pertinent family history in first degree relatives        Review of Systems  CONSTITUTIONAL:  Fevers[ ] chills[ ] sweats[ ] fatigue[ ] weight loss[ ] weight gain [ ]                                     NEGATIVE [ x]   NEURO:  parathesias[ ] seizures [ ]  syncope [ ]  confusion [ ]                                                                                NEGATIVE[ x]   EYES: glasses[ ]  blurry vision[ ]  discharge[ ] pain[ ] glaucoma [ ]                                                                          NEGATIVE[x ]   ENMT:  difficulty hearing [x ]  vertigo[ ]  dysphagia[ ] epistaxis[ ] recent dental work [ ]                                    NEGATIVE[ ]   CV:  chest pain[ ] palpitations[ ] HOWELL [ ] diaphoresis [ ]                                                                                           NEGATIVE[x ]   RESPIRATORY:  wheezing[ ] SOB[ ] cough [ ] sputum[ ] hemoptysis[ ]                                                                  NEGATIVE[x ]   GI:  nausea[ ]  vommiting [ ]  diarrhea[ ] constipation [ ] melena [ ]                                                                      NEGATIVE[x ]   : hematuria[ ]  dysuria[ ] urgency[ ] incontinence[ ]                                                                                            NEGATIVE[x ]   MUSKULOSKELETAL:  arthritis[ ]  joint swelling [ ] muscle weakness [ ]                                                                NEGATIVE[x ]   SKIN/BREAST:  rash[ ] itching [ ]  hair loss[ ] masses[ ]                                                                                              NEGATIVE[x ]   PSYCH:  dementia [ ] depresion [ ] anxiety[ ]                                                                                                               NEGATIVE[x ]   HEME/LYMPH:  bruises easily[ ] enlarged lymph nodes[ ] tender lymph nodes[ ]                                               NEGATIVE[x ]   ENDOCRINE:  cold intolerance[ ] heat intolerance[ ] polydipsia[ ]                                                                          NEGATIVE[x ]     PHYSICAL EXAM  Vital Signs Last 24 Hrs  T(C): 36.7 (16 Jun 2023 11:30), Max: 36.9 (16 Jun 2023 08:25)  T(F): 98.1 (16 Jun 2023 11:30), Max: 98.5 (16 Jun 2023 08:25)  HR: 75 (16 Jun 2023 12:32) (48 - 75)  BP: 141/87 (16 Jun 2023 12:32) (110/52 - 232/73)  BP(mean): 97 (16 Jun 2023 12:32) (89 - 102)  RR: 19 (16 Jun 2023 12:32) (16 - 20)  SpO2: 100% (16 Jun 2023 12:32) (93% - 100%)    Parameters below as of 16 Jun 2023 12:32  Patient On (Oxygen Delivery Method): room air        CONSTITUTIONAL:                                                                            Neuro: WNL[ ] Normal exam oriented to person/place & time with no focal motor or sensory  deficits. Other   Oriented x person and place                  Eyes: WNL[x ]   Normal exam of conjunctiva & lids, pupils equally reactive. Other     ENT: WNL[x ]    Normal exam of nasal/oral mucosa with absence of cyanosis. Other  Neck: WNL[x ]  Normal exam of jugular veins, trachea & thyroid. Other  Chest: WNL[x ] Normal lung exam with good air movement absence of wheezes, rales, or rhonchi: Other                                                                                CV:  Auscultation: normal [x ] S3[ ] S4[ ] Irregular [ ] Rub[ ] Clicks[ ]    Murmurs none:[ x]systolic [ ]  diastolic [ ] holosystolic [ ]  Carotids: No Bruits[ x] Other                 Abdominal Aorta: normal [x ] nonpalpable[ ]Other                                                                                      GI:           WNL[x ] Normal exam of abdomen, liver & spleen with no noted masses or tenderness. Other                                                                                                        Extremities: WNL[x ] Normal no evidence of cyanosis or deformity Edema: none[x ]trace[ ]1+[ ]2+[ ]3+[ ]4+[ ]  Lower Extremity Pulses: Right[pp ] Left[pp ]  SKIN :WNL[ ] Normal exam to inspection & palpation. Other:  Bilateral shins with bandages noted.                                                           LABS:                        8.1    17.80 )-----------( 245      ( 16 Jun 2023 12:15 )             25.6     06-16    138  |  101  |  29.0<H>  ----------------------------<  110<H>  4.8   |  25.0  |  1.45<H>    Ca    9.2      16 Jun 2023 10:42    TPro  6.6  /  Alb  3.6  /  TBili  0.4  /  DBili  x   /  AST  21  /  ALT  16  /  AlkPhos  87  06-16    PT/INR - ( 16 Jun 2023 10:42 )   PT: 11.9 sec;   INR: 1.03 ratio         PTT - ( 16 Jun 2023 10:42 )  PTT:28.4 sec      < from: CT Chest No Cont (06.16.23 @ 09:42) >    IMPRESSION:  Trace right pneumothorax secondary to right seventh, eighth, ninth and   10th rib fractures. The eighth rib is fractured in 2 places (flail   chest). Moderate right and trace left pleural effusion.    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 Focal ectasia   of the mid descending thoracic aorta to 2.8 cm. Limited evaluation   without IV contrast.    Dilated common duct to 1.3 cm. Questionable impacted 8mm calculus in the   distal common duct. Recommend ultrasound or MRI for additional   characterization.    Findings were discussed with  HÉCTOR BARBER [phone] 6/16/2023 10:17   AM by Dr. Gume Scott with read back confirmation.    --- End of Report ---    GUME SCOTT MD; Attending Radiologist  This document has been electronically signed. Jun 16 2023 10:18AM    < end of copied text >          < from: CT Angio Chest Aorta w/wo IV Cont (06.16.23 @ 11:23) >  IMPRESSION:    As on same day CT abdomen/pelvis, short segment dissection of the distal   descending thoracic aorta, likelyold. Age-indeterminate associated   penetrating atherosclerotic ulcer at the same level.    Unchanged small right hydropneumothorax with trace air component.    Unchanged displaced and nondisplaced fractures of the right seventh   through 11th ribs.    Unchanged dilated common bile duct and stone within the distal common   duct.    Multiple pancreatic cystic lesions.    --- End of Report ---        CHRISTIE ALVARENGA M.D., ATTENDING RADIOLOGIST  This document has been electronically signed. Jun 16 2023 12:51PM    < end of copied text >

## 2023-06-16 NOTE — CONSULT NOTE ADULT - PROBLEM SELECTOR RECOMMENDATION 9
S/P fall at home with rib fractures.   Consulted for finding of type b dissection on ct chest and again shown on cta chest.   Likely chronic.   No intervention from a ct surgery standpoint.   Recommend medical management and blood pressure control.   Discussed with Dr. Davila, as well as the surgery team S/P fall at home with rib fractures.   Consulted for finding of type b dissection on ct chest and again shown on cta chest.   Likely chronic.   No intervention from a ct surgery standpoint.   Recommend medical management and blood pressure control.   Will sign off.  Please reconsult as necessary.  Discussed with Dr. Davila, as well as the surgery team

## 2023-06-16 NOTE — ED PROVIDER NOTE - CLINICAL SUMMARY MEDICAL DECISION MAKING FREE TEXT BOX
89 y/o F with PMHx with HTN, dementia, who presents to the ED s/p fall at nursing home. States that she was in the bathroom and slipped and fell onto her R side. Pan scan, basic labs, pain control, reassess.

## 2023-06-16 NOTE — CONSULT NOTE ADULT - PROBLEM/RECOMMENDATION-1
Problem: NORMAL   Goal: Experiences normal transition  Description: INTERVENTIONS:  - Assess and monitor vital signs and lab values.   - Encourage skin-to-skin with caregiver for thermoregulation  - Assess signs, symptoms and risk factors for hypog DISPLAY PLAN FREE TEXT

## 2023-06-16 NOTE — PROCEDURE NOTE - ADDITIONAL PROCEDURE DETAILS
Using the lower border of scapula as a landmark for T7, the T9 spinous process was located. After sterile prep and gloves were don, an ultrasound probe was used to visualize the T9 transverse process. A 22G 80mm Pajunk needle was advanced in-plane and 15cc of Exparel + 15cc of Bupivacaine 0.25% was administered into the ESTEFANI. Aspiration q5ccs was negative for heme/csf/air.   Local anesthetic spread was visualized on ultrasound.  Meaningful patient verbal response maintained throughout procedure.  Block needle tip identified by ultrasound throughout the procedure.

## 2023-06-16 NOTE — ED ADULT NURSE NOTE - OBJECTIVE STATEMENT
" I fell in the bathroom when I was getting ready to go to breakfast" pt denies LOC, states she is unsure if she hit her head, denies blood thinners. pt co pain to right side radiating to right leg. pt AOX2 has hx of dementia. Fall was unwitnessed, coming from [facility]

## 2023-06-16 NOTE — ED PROVIDER NOTE - CARE PLAN
1 Principal Discharge DX:	Multiple rib fractures   Principal Discharge DX:	Multiple rib fractures  Secondary Diagnosis:	Aortic dissection

## 2023-06-16 NOTE — ED PROVIDER NOTE - OBJECTIVE STATEMENT
89 y/o F with PMHx with HTN, dementia, who presents to the ED s/p fall at nursing home. States that she was in the bathroom and slipped and fell onto her R side. Unsure if she hit her head but denies LOC or blood-thinner use. States that she has a laceration to her RLE and endorses R flank pain. Otherwise denies any other complaints at this time.

## 2023-06-16 NOTE — ED PROVIDER NOTE - PROGRESS NOTE DETAILS
Given the significant and immediate threats to this patient based on initial presentation, the benefits of emergency contrast-enhanced CT imaging without obtaining GFR/creatinine serum level results greatly outweigh the potential risk of harm due to contrast-induced nephropathy. -Anne DO Called by radiology, multiple rib fractures on CT in addition to Type B dissection. Patient taken back to CT for angio studies, hydralazine given for BP control with improvement to 170 systolic. Otherwise remains in no acute distress. - [place]

## 2023-06-16 NOTE — ED ADULT NURSE REASSESSMENT NOTE - NS ED NURSE REASSESS COMMENT FT1
Pt is being transported to ICU bed 3117, RN Starr for another nurse given report, pt history, reason for admission, medical history, medications given/to be administered reviewed, response to current interventions and treatments, review of patients current condition and baseline reviewed, Right bicep 20G and left AC 18G IV Site are clean/dry/intact, pt/family educated on the plan of care and provided time for question and answers, education deemed successful with teach back demonstration showing proficiency. Belongins that remain with patient are transported to ICU, Pt placed on the transport monitor, VS recorded in the EMR prior to transport, pt transported with 2 RNs and rescue medication.

## 2023-06-16 NOTE — PATIENT PROFILE ADULT - FLU SEASON?
No Referred To Oculoplastics For Closure Text (Leave Blank If You Do Not Want): After obtaining clear surgical margins the patient was sent to oculoplastics for surgical repair.  The patient understands they will receive post-surgical care and follow-up from the referring physician's office.

## 2023-06-16 NOTE — H&P ADULT - HISTORY OF PRESENT ILLNESS
89 yo F with PMH of HTN presents to the ED after falling in her bathroom on her right side. Patient had no LOC and is not on anticoagulation. Lives at assisted living facility and is able to ambulate with walker, however, dos not like to use it.     A: Intact, speaking in full sentences  B: Bilateral breath sounds  C: Palpable central and peripheral pulses, no evidence of active bleeding.  D: GCS 15, no evident deficits  E: Patient exposed, RLE open wound at mid leg.

## 2023-06-17 NOTE — PROGRESS NOTE ADULT - SUBJECTIVE AND OBJECTIVE BOX
24 Hr Events: patient awakens to stim, she complains of right sided chest pain but states he has improved. She still has difficulty clearing secretions at this time. Chest tube put out 354 cc / 24 hours. She is s/p 3 U PRBCs and hemoglobin has stabilized.     Vital Signs Last 24 Hrs  T(C): 37.4 (17 Jun 2023 12:00), Max: 38 (17 Jun 2023 07:00)  T(F): 99.3 (17 Jun 2023 12:00), Max: 100.4 (17 Jun 2023 07:00)  HR: 47 (17 Jun 2023 12:00) (46 - 92)  BP: 105/44 (17 Jun 2023 12:00) (83/35 - 174/64)  BP(mean): 62 (17 Jun 2023 12:00) (51 - 139)  RR: 15 (17 Jun 2023 12:00) (10 - 21)  SpO2: 94% (17 Jun 2023 12:00) (88% - 100%)    Parameters below as of 17 Jun 2023 09:00  Patient On (Oxygen Delivery Method): nasal cannula  O2 Flow (L/min): 2    I&O's Summary    16 Jun 2023 07:01  -  17 Jun 2023 07:00  --------------------------------------------------------  IN: 2272 mL / OUT: 614 mL / NET: 1658 mL    17 Jun 2023 07:01  -  17 Jun 2023 13:01  --------------------------------------------------------  IN: 270 mL / OUT: 190 mL / NET: 80 mL      MEDICATIONS  (STANDING):  acetaminophen     Tablet .. 650 milliGRAM(s) Oral every 6 hours  amantadine 100 milliGRAM(s) Oral every 12 hours  amLODIPine   Tablet 2.5 milliGRAM(s) Oral daily  chlorhexidine 2% Cloths 1 Application(s) Topical daily  escitalopram 10 milliGRAM(s) Oral daily  gabapentin 300 milliGRAM(s) Oral every 8 hours  heparin   Injectable 5000 Unit(s) SubCutaneous every 8 hours  levothyroxine 25 MICROGram(s) Oral daily  lidocaine   4% Patch 2 Patch Transdermal every 24 hours  losartan 100 milliGRAM(s) Oral daily  melatonin 5 milliGRAM(s) Oral at bedtime  methocarbamol 500 milliGRAM(s) Oral every 8 hours  metoprolol tartrate 25 milliGRAM(s) Oral every 12 hours  multiple electrolytes Injection Type 1 500 milliLiter(s) (30 mL/Hr) IV Continuous <Continuous>  multiple electrolytes Injection Type 1 Bolus 250 milliLiter(s) IV Bolus once  simvastatin 20 milliGRAM(s) Oral at bedtime    MEDICATIONS  (PRN):  HYDROmorphone  Injectable 0.2 milliGRAM(s) IV Push every 4 hours PRN Severe Pain (7 - 10)    Physical Exam   Gen: Resting comfortably in bed, sleeping, easily aroused    HEENT: PERRL, EOMI    Neurological: a+Ox1 (name), moves all extremities to command    Neck: no pain     Pulmonary: non labored, unable to cooperate with IS, weak cough     Cardiovascular: rrr    Gastrointestinal: Soft, non-tender, non-distended    : rainey    Extremities: pt has gash to her RLE, dressing changed and the wound is clean / dry       Labs                         8.8    6.17  )-----------( 136      ( 17 Jun 2023 04:41 )             27.8   06-17    137  |  101  |  30.7<H>  ----------------------------<  90  5.5<H>   |  21.0<L>  |  1.78<H>    Ca    8.3<L>      17 Jun 2023 09:45  Phos  6.4     06-17  Mg     2.0     06-17    TPro  6.6  /  Alb  3.6  /  TBili  0.4  /  DBili  0.1  /  AST  21  /  ALT  16  /  AlkPhos  87  06-16      ASSESSMENT/PLAN:  89F s/p fall with right 7-9 rib fractures and RLE wound    Neuro: pain control and delirium precauions. Pt is on Tylenol prn and diladid prn for pain. She did get a rib block by pain management yesterday. She is ordered for her home Amantadine and lexapro     CV: on home losartan, amlodipine, and metoprolol. She is now DNR     Pulm: Continue R chest tube to suction. Xray shows expansion of lung. Continue to encourage cough and use of IS. Pt is now DNI     GI/Nutrition: ordered for puree diet, will see if tolerates.     /Renal: continue rainey, will closely monitor urine output and Cr.     ID: no issues    Endo: no issues    Heme/DVT Prophylaxis: SCDs and Heparin 5000 TID     Lines/Tubes: PIVs    Dispo: DNR/DNI, continue icu level care

## 2023-06-17 NOTE — PROGRESS NOTE ADULT - PROBLEM SELECTOR PLAN 1
1. patient appears comfortable on exam  2. CONTINUE dilaudid 0.2 mg IVP q 4 severe pain. HOLD FOR UNSTABLE VITALS OR SEDATION  3. CONSIDER medicating 30 mins prior to anticipated movements as this is patient's worst pain  4. CONTINUE tylenol, neurontin, lidocaine and robaxin as ordered. hold any for sedation  5. will continue to follow  6. call with questions

## 2023-06-17 NOTE — PROGRESS NOTE ADULT - CRITICAL CARE ATTENDING COMMENT
Patient seen and examined on am rounds. Will repeat CXR< repeat BMP to monitor creatinine, needs tertiary survey. Dec IVF. H/H stable. Per family, pt is DNR/DNI, MOLST to be completed. Will cont to monitor in ICU. cont rainey for strict I&O, and h/o retention.

## 2023-06-17 NOTE — PROGRESS NOTE ADULT - SUBJECTIVE AND OBJECTIVE BOX
Chief Complaint: rib pain    Patient seen and examined at bedside. 89F s/p fall with right 7-9 rib fractures and RLE wound. Now s/p rib block yesterday POD #1. Patient found to be resting comfortably on exam today. Snoring. Awakes to stimuli. Family at bedside. Patient endorses pain moderately controlled on current regimen and s/p rib block. Pain worsened with movements, but improved s/p rib block. Using dilaudid IVP sparingly. Denies side effects      PAST MEDICAL & SURGICAL HISTORY:  Major depression      Dementia      Hypertension          SOCIAL HISTORY:  [ ] Denies Smoking, Alcohol, or Drug Use    Allergies    penicillin (Unknown)    Intolerances        PAIN MEDICATIONS:  acetaminophen     Tablet .. 650 milliGRAM(s) Oral every 6 hours  amantadine 100 milliGRAM(s) Oral every 12 hours  escitalopram 10 milliGRAM(s) Oral daily  gabapentin 300 milliGRAM(s) Oral every 8 hours  HYDROmorphone  Injectable 0.2 milliGRAM(s) IV Push every 4 hours PRN  melatonin 5 milliGRAM(s) Oral at bedtime  methocarbamol 500 milliGRAM(s) Oral every 8 hours    Heme:  heparin   Injectable 5000 Unit(s) SubCutaneous every 8 hours    Antibiotics:    Cardiac:  amLODIPine   Tablet 2.5 milliGRAM(s) Oral daily  losartan 100 milliGRAM(s) Oral daily  metoprolol tartrate 25 milliGRAM(s) Oral every 12 hours    Pulmonary:    Endocrine  levothyroxine 25 MICROGram(s) Oral daily  simvastatin 20 milliGRAM(s) Oral at bedtime    GI:    All Other Meds:  chlorhexidine 2% Cloths 1 Application(s) Topical daily  lidocaine   4% Patch 2 Patch Transdermal every 24 hours  multiple electrolytes Injection Type 1 500 milliLiter(s) IV Continuous <Continuous>      REVIEW OF SYSTEMS:  CONSTITUTIONAL: Negative fever,chills  NECK: No pain or stiffness  RESPIRATORY: No cough, wheezing,  No shortness of breath  GASTROINTESTINAL: No abdominal, No nausea, vomiting; No diarrhea or constipation.   GENITOURINARY: No dysuria, frequency, or incontinence  NEUROLOGICAL: No headaches, memory loss, loss of strength, numbness, or  SKIN: No itching, burning, rashes, or lesions   MUSCULOSKELETAL: No joint pain or swelling; No muscle, back, or extremity pain    PHYSICAL EXAM:    Vital Signs Last 24 Hrs  T(C): 37.1 (17 Jun 2023 14:00), Max: 38 (17 Jun 2023 07:00)  T(F): 98.8 (17 Jun 2023 14:00), Max: 100.4 (17 Jun 2023 07:00)  HR: 52 (17 Jun 2023 14:00) (46 - 92)  BP: 141/55 (17 Jun 2023 14:00) (83/35 - 174/64)  BP(mean): 76 (17 Jun 2023 14:00) (51 - 139)  RR: 17 (17 Jun 2023 14:00) (10 - 21)  SpO2: 100% (17 Jun 2023 14:00) (88% - 100%)    Parameters below as of 17 Jun 2023 09:00  Patient On (Oxygen Delivery Method): nasal cannula  O2 Flow (L/min): 2      PAIN SCORE:    6     SCALE USED: (1-10 VNRS)       GENERAL: Comfortable, in no apparent distress  HEENT:  Atraumatic, Normocephalic, PERRL, EOMI, nasal O2  NECK: Supple  LUNG: right chest tube in place to suction, right rib tenderness  ABDOMEN: Soft, Nontender, Nondistended;   BACK: No midline bony tenderness to palpation, no step off or deformity noted.   EXTREMITIES:  SULLIVAN X 4; 2+ Peripheral Pulses, No clubbing, cyanosis, or edema  NEUROLOGIC: Awake, alert and oriented X3;  No focal deficits. Motor strength 5/5. Sensation intact to light touch  SKIN: Warm and Dry    LABS:                          8.8    6.17  )-----------( 136      ( 17 Jun 2023 04:41 )             27.8     06-17    137  |  101  |  30.7<H>  ----------------------------<  90  5.5<H>   |  21.0<L>  |  1.78<H>    Ca    8.3<L>      17 Jun 2023 09:45  Phos  6.4     06-17  Mg     2.0     06-17    TPro  6.6  /  Alb  3.6  /  TBili  0.4  /  DBili  0.1  /  AST  21  /  ALT  16  /  AlkPhos  87  06-16    PT/INR - ( 17 Jun 2023 04:41 )   PT: 11.8 sec;   INR: 1.02 ratio         PTT - ( 17 Jun 2023 04:41 )  PTT:22.0 sec      Drug Screen:        RADIOLOGY:      [ ]  [place]  Reviewed and Copied to Chart

## 2023-06-18 NOTE — DIETITIAN INITIAL EVALUATION ADULT - PERTINENT MEDS FT
MEDICATIONS  (STANDING):  calcium gluconate IVPB 2 Gram(s) IV Intermittent once  chlorhexidine 2% Cloths 1 Application(s) Topical daily  escitalopram 10 milliGRAM(s) Oral daily  gabapentin 100 milliGRAM(s) Oral every 8 hours  heparin   Injectable 5000 Unit(s) SubCutaneous every 8 hours  levothyroxine 25 MICROGram(s) Oral daily  lidocaine   4% Patch 2 Patch Transdermal every 24 hours  melatonin 5 milliGRAM(s) Oral at bedtime  methocarbamol 500 milliGRAM(s) Oral every 8 hours  metoprolol tartrate 25 milliGRAM(s) Oral two times a day  multiple electrolytes Injection Type 1 500 milliLiter(s) (84 mL/Hr) IV Continuous <Continuous>  simvastatin 20 milliGRAM(s) Oral at bedtime    MEDICATIONS  (PRN):

## 2023-06-18 NOTE — DIETITIAN INITIAL EVALUATION ADULT - NSFNSGIIOFT_GEN_A_CORE
06-17-23 @ 07:01  -  06-18-23 @ 07:00  --------------------------------------------------------  OUT:    Chest Tube (mL): 480 mL  Total OUT: 480 mL    Total NET: -480 mL

## 2023-06-18 NOTE — CHART NOTE - NSCHARTNOTEFT_GEN_A_CORE
SICU TRANSFER NOTE  -----------------------------  ICU Admission Date: 6/16  Transfer Date: 06-18-23 @ 19:37    Admission Diagnosis: rib fractures    Active Problems/injuries: rib fractures    Procedures: chest tube 6/16    consult: pain    Medications  acetaminophen   IVPB .. 650 milliGRAM(s) IV Intermittent every 6 hours  chlorhexidine 2% Cloths 1 Application(s) Topical daily  escitalopram 10 milliGRAM(s) Oral daily  gabapentin 100 milliGRAM(s) Oral every 8 hours  glycopyrrolate Injectable 0.4 milliGRAM(s) IV Push four times a day PRN  lidocaine   4% Patch 2 Patch Transdermal every 24 hours  LORazepam   Injectable 0.5 milliGRAM(s) IV Push every 2 hours PRN  melatonin 5 milliGRAM(s) Oral at bedtime  methocarbamol IVPB 500 milliGRAM(s) IV Intermittent every 8 hours  morphine  - Injectable 2 milliGRAM(s) IV Push every 2 hours PRN  ondansetron Injectable 4 milliGRAM(s) IV Push every 6 hours PRN      [ x ] I attest I have reviewed and reconciled all medications prior to transfer    IV Fluids  sodium chloride 0.9% Bolus:   500 milliLiter(s), IV Bolus, once, infuse over 30 Minute(s), Stop After 1 Doses  Provider's Contact #: [phone]      I have discussed this case with MICKIE Sam upon transfer and all questions regarding ICU course were answered.  The following items are to be followed up:  - comfort measures only  - tertiary deferred as patient is comfort measures only and was expressing discomfort with tertiary exam

## 2023-06-18 NOTE — DIETITIAN INITIAL EVALUATION ADULT - NUTRITIONGOAL OUTCOME1
Tolerate diet when resumed as tolerated.   Provide nutrition supplement as needed.  Continue puree diet as tolerated.

## 2023-06-18 NOTE — DIETITIAN INITIAL EVALUATION ADULT - PERTINENT LABORATORY DATA
06-18    137  |  101  |  34.9<H>  ----------------------------<  90  5.1   |  21.0<L>  |  2.03<H>    Ca    8.0<L>      18 Jun 2023 02:15  Phos  5.8     06-18  Mg     2.2     06-18    TPro  6.6  /  Alb  3.6  /  TBili  0.4  /  DBili  0.1  /  AST  21  /  ALT  16  /  AlkPhos  87  06-16

## 2023-06-18 NOTE — GOALS OF CARE CONVERSATION - ADVANCED CARE PLANNING - CONVERSATION DETAILS
family state that patient would not want chest compressions if she were to arrest. They state that she wound not want a breathing tube for a long period of time. they are unsure if she would want a breathing tube if the reason for the breathing tube was reversible. I stated that they could think about this and we could discuss it later on. family clear that patient is DNR.
Family wants to transition patient to comfort measures. Discussed what this means for patient's hospital course. Comfort measure order placed in chart.

## 2023-06-18 NOTE — PROGRESS NOTE ADULT - ATTENDING COMMENTS
R rib fx 7-10  Type B aortic dissection  R hemothorax  Awake but somnolent and intermittently follows commands   GCS12  Bilateral BS, good inspiratory effort, somewhat hypercapnic  Will benefit from intermittent BIPAP mask  Hemodynamic intact, Hgb 7.5g/dl, will transfuse 1 U PRBC  Abdomen soft, active BS  Patient could take PO but still to somnolent  DNR DNI  Neurologic grossly intact R rib fx 7-10  Type B aortic dissection  R hemothorax  Awake but somnolent and intermittently follows commands   GCS12  Bilateral BS, good inspiratory effort, somewhat hypercapnic  Will benefit from intermittent BIPAP mask  Hemodynamic intact, Hgb 11  Abdomen soft, active BS  Patient could take PO but still to somnolent  DNR DNI  Neurologic grossly intact

## 2023-06-18 NOTE — DIETITIAN INITIAL EVALUATION ADULT - ORAL INTAKE PTA/DIET HISTORY
Cannot obtain interview at this time. Pt on a 1:1. As per EMR pt with decreased po and weight loss ~10#.

## 2023-06-18 NOTE — DIETITIAN NUTRITION RISK NOTIFICATION - ADDITIONAL COMMENTS/DIETITIAN RECOMMENDATIONS
Diet as tolerated puree  Provide nutritional supplement as needed  Rec Ensure with meals TID to optimize po intake and provide an additional 350 kcal, 20g protein per serving

## 2023-06-18 NOTE — DIETITIAN INITIAL EVALUATION ADULT - ADD RECOMMEND
resume diet as tolerated  Provide nutritional supplement as needed resume diet as tolerated.  Ensure TID to optimize po intake and provide an additional 350 kcal, 20g protein per serving

## 2023-06-18 NOTE — PROGRESS NOTE ADULT - SUBJECTIVE AND OBJECTIVE BOX
24 HOUR EVENTS: Patient having intermittent episodes of confusion and agitation. Patient in confused state c/o of pain. Given Ofirmev x2 with relief of both pain and delirium. Patient sating well. Chest tube with minimal output. BP stable.     SUBJECTIVE/ROS:  [ ] A ten-point review of systems was otherwise negative except as noted.  [ ] Due to altered mental status/intubation, subjective information were not able to be obtained from the patient. History was obtained, to the extent possible, from review of the chart and collateral sources of information.      NEURO  RASS:     GCS:     CAM ICU:  Exam:   Meds: escitalopram 10 milliGRAM(s) Oral daily  gabapentin 100 milliGRAM(s) Oral every 8 hours  HYDROmorphone  Injectable 0.2 milliGRAM(s) IV Push every 4 hours PRN Severe Pain (7 - 10)  melatonin 5 milliGRAM(s) Oral at bedtime  methocarbamol 500 milliGRAM(s) Oral every 8 hours  oxyCODONE    IR 2.5 milliGRAM(s) Oral every 6 hours PRN Moderate Pain (4 - 6)    [x] Adequacy of sedation and pain control has been assessed and adjusted      RESPIRATORY  RR: 13 (06-18-23 @ 04:00) (12 - 21)  SpO2: 97% (06-18-23 @ 04:00) (90% - 100%)  Wt(kg): --  Exam: unlabored, clear to auscultation bilaterally  Mechanical Ventilation:   ABG - ( 17 Jun 2023 08:54 )  pH: 7.330 /  pCO2: 46    /  pO2: 142   / HCO3: 24    / Base Excess: -1.6  /  SaO2: 99.9    Lactate: x                [ ] Extubation Readiness Assessed  Meds:       CARDIOVASCULAR  HR: 52 (06-18-23 @ 04:00) (46 - 64)  BP: 141/47 (06-18-23 @ 04:00) (105/44 - 174/64)  BP(mean): 74 (06-18-23 @ 04:00) (62 - 118)  ABP: 147/39 (06-18-23 @ 04:00) (99/53 - 236/221)  ABP(mean): 73 (06-18-23 @ 04:00) (68 - 266)  Wt(kg): --  CVP(cm H2O): --  VBG - ( 16 Jun 2023 12:14 )  pH: 7.480 /  pCO2: 36    /  pO2: 151   / HCO3: 27    / Base Excess: 3.3   /  SaO2: 98.8   Lactate: 1.30               Exam:  Cardiac Rhythm:  Perfusion     [ ]Adequate   [ ]Inadequate  Mentation   [ ]Normal       [ ]Reduced  Extremities  [ ]Warm         [ ]Cool  Volume Status [ ]Hypervolemic [ ]Euvolemic [ ]Hypovolemic  Meds: metoprolol tartrate 25 milliGRAM(s) Oral two times a day        GI/NUTRITION  Exam:  Diet:  Meds:     GENITOURINARY  I&O's Detail    06-16 @ 07:01  -  06-17 @ 07:00  --------------------------------------------------------  IN:    multiple electrolytes Injection Type 1.: 1350 mL    PRBCs (Packed Red Blood Cells): 922 mL  Total IN: 2272 mL    OUT:    Chest Tube (mL): 690 mL    Indwelling Catheter - Urethral (mL): 260 mL  Total OUT: 950 mL    Total NET: 1322 mL      06-17 @ 07:01 - 06-18 @ 05:10  --------------------------------------------------------  IN:    multiple electrolytes Injection Type 1 Bolus: 250 mL    multiple electrolytes Injection Type 1.: 150 mL    multiple electrolytes Injection Type 1.: 480 mL    multiple electrolytes Injection Type 1.: 84 mL    Oral Fluid: 150 mL  Total IN: 1114 mL    OUT:    Chest Tube (mL): 250 mL    Indwelling Catheter - Urethral (mL): 355 mL  Total OUT: 605 mL    Total NET: 509 mL          06-18    137  |  101  |  34.9<H>  ----------------------------<  90  5.1   |  21.0<L>  |  2.03<H>    Ca    8.0<L>      18 Jun 2023 02:15  Phos  5.8     06-18  Mg     2.2     06-18    TPro  6.6  /  Alb  3.6  /  TBili  0.4  /  DBili  0.1  /  AST  21  /  ALT  16  /  AlkPhos  87  06-16    [ ] Machado catheter, indication: N/A  Meds: multiple electrolytes Injection Type 1 500 milliLiter(s) IV Continuous <Continuous>        HEMATOLOGIC  Meds: heparin   Injectable 5000 Unit(s) SubCutaneous every 8 hours    [x] VTE Prophylaxis                        11.4   10.47 )-----------( 159      ( 18 Jun 2023 02:15 )             35.4     PT/INR - ( 17 Jun 2023 04:41 )   PT: 11.8 sec;   INR: 1.02 ratio         PTT - ( 17 Jun 2023 04:41 )  PTT:22.0 sec  Transfusion     [ ] PRBC   [ ] Platelets   [ ] FFP   [ ] Cryoprecipitate      INFECTIOUS DISEASES  T(C): 37.3 (06-18-23 @ 04:00), Max: 38 (06-17-23 @ 07:00)  Wt(kg): --  WBC Count: 10.47 K/uL (06-18 @ 02:15)  WBC Count: 10.14 K/uL (06-17 @ 20:25)    Recent Cultures:    Meds:       ENDOCRINE  Capillary Blood Glucose    Meds: levothyroxine 25 MICROGram(s) Oral daily  simvastatin 20 milliGRAM(s) Oral at bedtime        ACCESS DEVICES:  [ ] Peripheral IV  [ ] Central Venous Line	[ ] R	[ ] L	[ ] IJ	[ ] Fem	[ ] SC	Placed:   [ ] Arterial Line		[ ] R	[ ] L	[ ] Fem	[ ] Rad	[ ] Ax	Placed:   [ ] PICC:					[ ] Mediport  [ ] Urinary Catheter, Date Placed:   [ ] Necessity of urinary, arterial, and venous catheters discussed    OTHER MEDICATIONS:  chlorhexidine 2% Cloths 1 Application(s) Topical daily  lidocaine   4% Patch 2 Patch Transdermal every 24 hours      CODE STATUS: Yes        IMAGING:     24 HOUR EVENTS: Patient having intermittent episodes of confusion and agitation. Patient in confused state c/o of pain. Given Ofirmev x2 with relief of both pain and delirium. Patient sating well. Chest tube with minimal output. BP stable. Patient w/ worsening LIVIER and oliguria, resuscitated with 1L with adequate response.     SUBJECTIVE/ROS:  [x ] A ten-point review of systems was otherwise negative except as noted.  [ ] Due to altered mental status/intubation, subjective information were not able to be obtained from the patient. History was obtained, to the extent possible, from review of the chart and collateral sources of information.    ICU Vital Signs Last 24 Hrs  T(C): 37.2 (18 Jun 2023 05:00), Max: 38 (17 Jun 2023 07:00)  T(F): 99 (18 Jun 2023 05:00), Max: 100.4 (17 Jun 2023 07:00)  HR: 48 (18 Jun 2023 05:00) (46 - 64)  BP: 130/47 (18 Jun 2023 05:00) (105/44 - 174/64)  BP(mean): 73 (18 Jun 2023 05:00) (62 - 118)  ABP: 135/46 (18 Jun 2023 05:00) (99/53 - 236/221)  ABP(mean): 73 (18 Jun 2023 05:00) (68 - 266)  RR: 13 (18 Jun 2023 05:00) (12 - 21)  SpO2: 95% (18 Jun 2023 05:00) (90% - 100%)    O2 Parameters below as of 17 Jun 2023 20:00  Patient On (Oxygen Delivery Method): nasal cannula  O2 Flow (L/min): 2      Physical Exam   Gen: Resting comfortably in bed, easily arouses to voice, intermittent periods of agitation when awakened      HEENT: PERRL, EOMI    Neurological: AAOx1, confused, CAM + moves all extremities     Pulmonary: non labored, unable to cooperate with IS, weak cough     Cardiovascular: Sinus bradycardia     Gastrointestinal: Soft, non-tender, non-distended    : rainey    Extremities: wound to RLE, dressing changed and the wound is clean / dry     NEURO  RASS:     GCS:     CAM ICU:  Exam:   Meds: escitalopram 10 milliGRAM(s) Oral daily  gabapentin 100 milliGRAM(s) Oral every 8 hours  HYDROmorphone  Injectable 0.2 milliGRAM(s) IV Push every 4 hours PRN Severe Pain (7 - 10)  melatonin 5 milliGRAM(s) Oral at bedtime  methocarbamol 500 milliGRAM(s) Oral every 8 hours  oxyCODONE    IR 2.5 milliGRAM(s) Oral every 6 hours PRN Moderate Pain (4 - 6)    [x] Adequacy of sedation and pain control has been assessed and adjusted      RESPIRATORY  RR: 13 (06-18-23 @ 04:00) (12 - 21)  SpO2: 97% (06-18-23 @ 04:00) (90% - 100%)  Wt(kg): --  Exam: unlabored, clear to auscultation bilaterally  Mechanical Ventilation:   ABG - ( 17 Jun 2023 08:54 )  pH: 7.330 /  pCO2: 46    /  pO2: 142   / HCO3: 24    / Base Excess: -1.6  /  SaO2: 99.9    Lactate: x                [ ] Extubation Readiness Assessed  Meds:       CARDIOVASCULAR  HR: 52 (06-18-23 @ 04:00) (46 - 64)  BP: 141/47 (06-18-23 @ 04:00) (105/44 - 174/64)  BP(mean): 74 (06-18-23 @ 04:00) (62 - 118)  ABP: 147/39 (06-18-23 @ 04:00) (99/53 - 236/221)  ABP(mean): 73 (06-18-23 @ 04:00) (68 - 266)  Wt(kg): --  CVP(cm H2O): --  VBG - ( 16 Jun 2023 12:14 )  pH: 7.480 /  pCO2: 36    /  pO2: 151   / HCO3: 27    / Base Excess: 3.3   /  SaO2: 98.8   Lactate: 1.30               Exam:  Cardiac Rhythm:  Perfusion     [ ]Adequate   [ ]Inadequate  Mentation   [ ]Normal       [ ]Reduced  Extremities  [ ]Warm         [ ]Cool  Volume Status [ ]Hypervolemic [ ]Euvolemic [ ]Hypovolemic  Meds: metoprolol tartrate 25 milliGRAM(s) Oral two times a day        GI/NUTRITION  Exam:  Diet:  Meds:     GENITOURINARY  I&O's Detail    06-16 @ 07:01  -  06-17 @ 07:00  --------------------------------------------------------  IN:    multiple electrolytes Injection Type 1.: 1350 mL    PRBCs (Packed Red Blood Cells): 922 mL  Total IN: 2272 mL    OUT:    Chest Tube (mL): 690 mL    Indwelling Catheter - Urethral (mL): 260 mL  Total OUT: 950 mL    Total NET: 1322 mL      06-17 @ 07:01  -  06-18 @ 05:10  --------------------------------------------------------  IN:    multiple electrolytes Injection Type 1 Bolus: 250 mL    multiple electrolytes Injection Type 1.: 150 mL    multiple electrolytes Injection Type 1.: 480 mL    multiple electrolytes Injection Type 1.: 84 mL    Oral Fluid: 150 mL  Total IN: 1114 mL    OUT:    Chest Tube (mL): 250 mL    Indwelling Catheter - Urethral (mL): 355 mL  Total OUT: 605 mL    Total NET: 509 mL          06-18    137  |  101  |  34.9<H>  ----------------------------<  90  5.1   |  21.0<L>  |  2.03<H>    Ca    8.0<L>      18 Jun 2023 02:15  Phos  5.8     06-18  Mg     2.2     06-18    TPro  6.6  /  Alb  3.6  /  TBili  0.4  /  DBili  0.1  /  AST  21  /  ALT  16  /  AlkPhos  87  06-16    [ ] Rainey catheter, indication: N/A  Meds: multiple electrolytes Injection Type 1 500 milliLiter(s) IV Continuous <Continuous>        HEMATOLOGIC  Meds: heparin   Injectable 5000 Unit(s) SubCutaneous every 8 hours    [x] VTE Prophylaxis                        11.4   10.47 )-----------( 159      ( 18 Jun 2023 02:15 )             35.4     PT/INR - ( 17 Jun 2023 04:41 )   PT: 11.8 sec;   INR: 1.02 ratio         PTT - ( 17 Jun 2023 04:41 )  PTT:22.0 sec  Transfusion     [ ] PRBC   [ ] Platelets   [ ] FFP   [ ] Cryoprecipitate      INFECTIOUS DISEASES  T(C): 37.3 (06-18-23 @ 04:00), Max: 38 (06-17-23 @ 07:00)  Wt(kg): --  WBC Count: 10.47 K/uL (06-18 @ 02:15)  WBC Count: 10.14 K/uL (06-17 @ 20:25)    Recent Cultures:    Meds:       ENDOCRINE  Capillary Blood Glucose    Meds: levothyroxine 25 MICROGram(s) Oral daily  simvastatin 20 milliGRAM(s) Oral at bedtime        ACCESS DEVICES:  [ ] Peripheral IV  [ ] Central Venous Line	[ ] R	[ ] L	[ ] IJ	[ ] Fem	[ ] SC	Placed:   [ ] Arterial Line		[ ] R	[ ] L	[ ] Fem	[ ] Rad	[ ] Ax	Placed:   [ ] PICC:					[ ] Mediport  [ ] Urinary Catheter, Date Placed:   [ ] Necessity of urinary, arterial, and venous catheters discussed    OTHER MEDICATIONS:  chlorhexidine 2% Cloths 1 Application(s) Topical daily  lidocaine   4% Patch 2 Patch Transdermal every 24 hours      CODE STATUS: Yes        IMAGING:

## 2023-06-18 NOTE — CHART NOTE - NSCHARTNOTEFT_GEN_A_CORE
Admission Diagnosis: rib fractures    Active Problems/injuries: rib fractures    Procedures: chest tube 6/16    consult: pain      MEDICATIONS  (STANDING):  acetaminophen   IVPB .. 650 milliGRAM(s) IV Intermittent every 6 hours  escitalopram 10 milliGRAM(s) Oral daily  gabapentin 100 milliGRAM(s) Oral every 8 hours  glycopyrrolate Injectable 0.2 milliGRAM(s) IV Push once  lidocaine   4% Patch 2 Patch Transdermal every 24 hours  melatonin 5 milliGRAM(s) Oral at bedtime  methocarbamol IVPB 500 milliGRAM(s) IV Intermittent every 8 hours  morphine  - Injectable 2 milliGRAM(s) IV Push once    MEDICATIONS  (PRN):  glycopyrrolate Injectable 0.4 milliGRAM(s) IV Push four times a day PRN Secretions  LORazepam   Injectable 0.5 milliGRAM(s) IV Push every 2 hours PRN Anxiety  morphine  - Injectable 2 milliGRAM(s) IV Push every 2 hours PRN Moderate pain (4-6), Severe pain (7-10), Respiratory rate greater than 22  ondansetron Injectable 4 milliGRAM(s) IV Push every 6 hours PRN Nausea and/or Vomiting      Vital Signs Last 24 Hrs  T(C): 36.4 (18 Jun 2023 16:00), Max: 37.5 (18 Jun 2023 01:00)  T(F): 97.5 (18 Jun 2023 16:00), Max: 99.5 (18 Jun 2023 01:00)  HR: 52 (18 Jun 2023 18:00) (45 - 68)  BP: 125/43 (18 Jun 2023 18:00) (115/47 - 192/59)  BP(mean): 66 (18 Jun 2023 18:00) (66 - 124)  RR: 15 (18 Jun 2023 18:00) (11 - 20)  SpO2: 96% (18 Jun 2023 18:00) (90% - 99%)    Parameters below as of 18 Jun 2023 18:00  Patient On (Oxygen Delivery Method): nasal cannula  O2 Flow (L/min): 4      A: 89F s/p fall with right 7-9 rib fractures and RLE wound, made comfort measures by family this evening      Discussed with MICKIE Raines  - patient comfort care  - medications ordered for pain

## 2023-06-19 NOTE — PROGRESS NOTE ADULT - SUBJECTIVE AND OBJECTIVE BOX
Subjective: Patient seen at bedside resting comfortably, Per RN no current complaints, no overnight events      MEDICATIONS  (STANDING):  acetaminophen   IVPB .. 650 milliGRAM(s) IV Intermittent every 6 hours  escitalopram 10 milliGRAM(s) Oral daily  gabapentin 100 milliGRAM(s) Oral every 8 hours  lidocaine   4% Patch 2 Patch Transdermal every 24 hours  melatonin 5 milliGRAM(s) Oral at bedtime  methocarbamol IVPB 500 milliGRAM(s) IV Intermittent every 8 hours    MEDICATIONS  (PRN):  glycopyrrolate Injectable 0.4 milliGRAM(s) IV Push four times a day PRN Secretions  LORazepam   Injectable 0.5 milliGRAM(s) IV Push every 2 hours PRN Anxiety  morphine  - Injectable 2 milliGRAM(s) IV Push every 2 hours PRN Moderate pain (4-6), Severe pain (7-10), Respiratory rate greater than 22  ondansetron Injectable 4 milliGRAM(s) IV Push every 6 hours PRN Nausea and/or Vomiting      Vital Signs Last 24 Hrs  T(C): 36 (18 Jun 2023 20:54), Max: 36.6 (18 Jun 2023 06:00)  T(F): 96.8 (18 Jun 2023 20:54), Max: 97.9 (18 Jun 2023 06:00)  HR: 77 (18 Jun 2023 20:54) (45 - 78)  BP: 118/55 (18 Jun 2023 20:30) (115/47 - 192/59)  BP(mean): 71 (18 Jun 2023 20:30) (66 - 124)  RR: 19 (18 Jun 2023 20:54) (11 - 21)  SpO2: 97% (18 Jun 2023 20:30) (93% - 99%)    Parameters below as of 18 Jun 2023 20:00  Patient On (Oxygen Delivery Method): nasal cannula  O2 Flow (L/min): 4      Physical Exam:    Constitutional: NAD  HEENT: PERRL, EOMI  Respiratory: Respirations non-labored, no accessory muscle use  Gastrointestinal: Soft, non-tender, non-distended      LABS:                        10.5   7.71  )-----------( 139      ( 18 Jun 2023 10:00 )             32.5     06-18    135  |  101  |  35.7<H>  ----------------------------<  84  4.9   |  20.0<L>  |  2.03<H>    Ca    7.7<L>      18 Jun 2023 10:00  Phos  5.9     06-18  Mg     2.3     06-18            A: 89F s/p fall with right 7-9 rib fractures and RLE wound, made comfort measures by family yesterday evening    P:  Pain control  Comfort measures

## 2023-06-19 NOTE — PROGRESS NOTE ADULT - NS ATTEND AMEND GEN_ALL_CORE FT
I have seen and examined this patient with the team.  No acute events overnight.    Patient appears unchanged this morning.  Still on comfort care.  No signs of air hunger.  Appreciate palliative care recs.  Will continue to monitor for now.

## 2023-06-20 NOTE — CHART NOTE - NSCHARTNOTEFT_GEN_A_CORE
Staff notified by patient family that patient had passed. Patient seen and examined. Patient noted to be pulseless, without cardiac activity, without spontaneous respirations, no response to noxious stimuli or pupillary response. Patient pronounced at 19:26. Dr. Du notified. Family bedside and notified of patient passing.

## 2023-06-20 NOTE — PROGRESS NOTE ADULT - ASSESSMENT
A: 89F s/p fall with right 7-9 rib fractures and RLE wound, made comfort measures by family yesterday evening    P:  Pain control  Comfort measures
ASSESSMENT/PLAN:  89F s/p fall with right 7-9 rib fractures and RLE wound    Neuro: pain control and delirium precauions. S/p rib block. Continue multimodal pain regimen, avoid narcotics if able.  C/w home medications Amantadine and lexapro     CV: on home losartan, amlodipine, and metoprolol. No hemodynamic issues     Pulm: Continue R chest tube to suction. AM CXR. Continue to encourage cough and use of IS. Pt is now DNI     GI/Nutrition: ordered for puree diet given lack of dentures.     /Renal: continue rainey, LIVIER and oliguria resolving with resuscitation. Continue to monitor closely.     ID: no issues    Endo: no issues    Heme/DVT Prophylaxis: SCDs and Heparin 5000 TID     Lines/Tubes: PIVs    Dispo: DNR/DNI, SICU
89F s/p fall with right 7-9 rib fractures and RLE wound. Now s/p rib block yesterday POD #1. Patient found to be resting comfortably on exam today. Snoring. Awakes to stimuli. Family at bedside. Patient endorses pain moderately controlled on current regimen and s/p rib block. Pain worsened with movements, but improved s/p rib block.

## 2023-06-20 NOTE — DISCHARGE NOTE FOR THE EXPIRED PATIENT - DEATH FALLS UNDER ME JURISDICTION?
Chief Complaints and History of Present Illnesses   Patient presents with     Retinal Detachment Follow Up     Chief Complaint(s) and History of Present Illness(es)     Retinal Detachment Follow Up     Laterality: left eye    Duration: 1 year              Comments     Pt. Sates that [facility] LE is still really blurry. No pain BE. No flashes or floaters BE.   Heike Sullivan COT 8:09 AM March 15, 2022                   
Yes/ME Accepts Jurisdiction

## 2023-06-20 NOTE — DISCHARGE NOTE FOR THE EXPIRED PATIENT - HOSPITAL COURSE
89 yo F with PMH of HTN presents to the ED after falling in her bathroom on her right side. Patient had no LOC and is not on anticoagulation. Lives at assisted living facility and is able to ambulate with walker, however, dos not like to use it. 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 To note CTA surgery consulted for dissection, likely chronic and no intervention needed  Patient admitted to the SICU, Pigtail placed on 6/16 ( date of admission). Patient began to become delirious, hypercapnic, palliative consulted and patient made DNIR/DNI. Patient  89 yo F with PMH of HTN presents to the ED after falling in her bathroom on her right side. Patient had no LOC and is not on anticoagulation. Lives at assisted living facility and is able to ambulate with walker, however, dos not like to use it. 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 To note CTA surgery consulted for dissection, likely chronic and no intervention needed  Patient admitted to the SICU, Pigtail placed on 6/16 ( date of admission). Patient began to become delirious, hypercapnic, palliative consulted and patient made DNIR/DNI. Patient transferred to the floor and became comfort measures. Patient passed on 6/20/23 at 19:26

## 2023-06-20 NOTE — CONSULT NOTE ADULT - SUBJECTIVE AND OBJECTIVE BOX
Palliative Care Consult  HPI This is a 89 year old female PMHx HTN, Dementia arrived to [facility] after a fall at her assisted living facility. Patient sustained 4 R rib fractures with 2 displaced and 1 broken in 2 segments. No reports of chest pain, n/v/d/c/fever on arrival. 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 Patient also with pneumthorax, chest tube placed. Admitted to SICU for further care. Family meeting held with SICU team regarding care - family opted for comfort measures only, code status changed to DNR/DNI.  Palliative care consulted for assistance in comfort care.     <HPI:  89 yo F with PMH of HTN presents to the ED after falling in her bathroom on her right side. Patient had no LOC and is not on anticoagulation. Lives at assisted living facility and is able to ambulate with walker, however, dos not like to use it.   A: Intact, speaking in full sentences  B: Bilateral breath sounds  C: Palpable central and peripheral pulses, no evidence of active bleeding.  D: GCS 15, no evident deficits  E: Patient exposed, RLE open wound at mid leg.  (16 Jun 2023 11:14)>end of copied text    PERTINENT PMH REVIEWED: Yes     PAST MEDICAL & SURGICAL HISTORY:  Major depression    Dementia    Hypertension    SOCIAL HISTORY:                      Substance history: none                    Admitted from:  home                      Temple/spirituality:Congregational                    Cultural concerns:none                      Surrogate/HCP/Guardian: Phone#: Saran Rdz  [phone]    FAMILY HISTORY:  No pertinent family history in first degree relatives    Allergies  penicillin (Unknown)    ADVANCE DIRECTIVES/TREATMENT PREFERENCES:  DNR DNI MOLST  Comfort care    Baseline ADLs (prior to admission):  Dependent      Karnofsky/Palliative Performance Status Version 2:  %1-10  http://npcrc.org/files/news/[email or web address]_performance_[email or web address]_[email or web address]    Present Symptoms:     Dyspnea: Mild Moderate Severe  Nausea/Vomiting: Yes No  Anxiety:  Yes No  Depression: Yes No  Fatigue: Yes No  Loss of appetite: Yes No  Constipation:     Pain:   Unable to obtain due to poor mentation     Pain AD Score:0  [email or web address] (press ctrl + left click to view)    Review of Systems: Reviewed  Unable to obtain due to poor mentation     MEDICATIONS  (STANDING):  lidocaine   4% Patch 2 Patch Transdermal every 24 hours  morphine  Infusion 2 mG/Hr (2 mL/Hr) IV Continuous <Continuous>    MEDICATIONS  (PRN):  glycopyrrolate Injectable 0.4 milliGRAM(s) IV Push four times a day PRN Secretions  LORazepam   Injectable 0.5 milliGRAM(s) IV Push every 2 hours PRN respirations >20 or respiratory distress  LORazepam   Injectable 0.5 milliGRAM(s) IV Push every 4 hours PRN BREAKTHROUGH agitation  morphine  - Injectable 2 milliGRAM(s) IV Push every 3 hours PRN BREAKTHROUGH pain/dyspnea not relieved by infusion      PHYSICAL EXAM:    Vital Signs Last 24 Hrs  T(C): --  T(F): --  HR: --  BP: --  BP(mean): --  RR: --  SpO2: --    General:  lethargic     HEENT: dry mouth      Lungs: shallow breathing    CV: normal      GI:  incontinent    :  rainey oliguria    MSK: weakness    bedbound    Neuro: cognitive impairment     Skin: thin dry skin, abrasion/wounds lower extremities    LABS:  no labs, comfort care    RADIOLOGY & ADDITIONAL STUDIES:  CXR 06.17.23  IMPRESSION:  First study is from 6:44 PM and shows enlarged heart. Right-sided   coronary artery stents or dense calcification present. Right chest tube.   Prominent interstitial markings bilaterally  COPD. Small bilateral effusions. Prominent pulmonary arteries likely   secondary to pulmonary hypertension. Degenerative changes are seen in the   bones...  The follow-up is from 6/17, 12:44 AM and shows improving aeration at the   right base. Enlarging left effusion..  The follow-up is from 10:19 AM and shows interval placement of a left   midline. Otherwise findings are stable..    CT angio abd / pelvis 06.16.23  IMPRESSION:  As on same day CT abdomen/pelvis, short segment dissection of the distal   descending thoracic aorta, likelyold. Age-indeterminate associated   penetrating atherosclerotic ulcer at the same level.  Unchanged small right hydropneumothorax with trace air component.  Unchanged displaced and nondisplaced fractures of the right seventh   through 11th ribs.  Unchanged dilated common bile duct and stone within the distal common   duct.  Multiple pancreatic cystic lesions.    CT chest 06.16.23  IMPRESSION:  Trace right pneumothorax secondary to right seventh, eighth, ninth and   10th rib fractures. The eighth rib is fractured in 2 places (flail   chest). Moderate right and trace left pleural effusion.  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 Focal ectasia   of the mid descending thoracic aorta to 2.8 cm. Limited evaluation   without IV contrast.  Dilated common duct to 1.3 cm. Questionable impacted 8mm calculus in the   distal common duct. Recommend ultrasound or MRI for additional   characterization.   Palliative Care Consult  HPI This is a 89 year old female PMHx HTN, Dementia arrived to [facility] after a fall at her assisted living facility. Patient sustained 4 R rib fractures with 2 displaced and 1 broken in 2 segments. No reports of chest pain, n/v/d/c/fever on arrival. 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 Patient also with pneumthorax, chest tube placed. Admitted to SICU for further care. Family meeting held with SICU team regarding care - family opted for comfort measures only, code status changed to DNR/DNI.  Palliative care consulted for assistance in comfort care.     <HPI:  89 yo F with PMH of HTN presents to the ED after falling in her bathroom on her right side. Patient had no LOC and is not on anticoagulation. Lives at assisted living facility and is able to ambulate with walker, however, dos not like to use it.   A: Intact, speaking in full sentences  B: Bilateral breath sounds  C: Palpable central and peripheral pulses, no evidence of active bleeding.  D: GCS 15, no evident deficits  E: Patient exposed, RLE open wound at mid leg.  (16 Jun 2023 11:14)>end of copied text    PERTINENT PMH REVIEWED: Yes     PAST MEDICAL & SURGICAL HISTORY:  Major depression    Dementia    Hypertension    SOCIAL HISTORY:                      Substance history: none                    Admitted from:  home                      Advent/spirituality:Evangelical                    Cultural concerns:none                      Surrogate/HCP/Guardian: Phone#: Saran Rdz  [phone]    FAMILY HISTORY:  No pertinent family history in first degree relatives    Allergies  penicillin (Unknown)    ADVANCE DIRECTIVES/TREATMENT PREFERENCES:  DNR DNI MOLST  Comfort care    Baseline ADLs (prior to admission):  Dependent      Karnofsky/Palliative Performance Status Version 2:  %1-10  http://npcrc.org/files/news/[email or web address]_performance_[email or web address]_[email or web address]    Present Symptoms: Unable to obtain due to poor mentation   Non verbal cues of discomfort absent     Pain:   Unable to obtain due to poor mentation     Pain AD Score:0  [email or web address] (press ctrl + left click to view)    Review of Systems: Reviewed  Unable to obtain due to poor mentation     MEDICATIONS  (STANDING):  lidocaine   4% Patch 2 Patch Transdermal every 24 hours  morphine  Infusion 2 mG/Hr (2 mL/Hr) IV Continuous <Continuous>    MEDICATIONS  (PRN):  glycopyrrolate Injectable 0.4 milliGRAM(s) IV Push four times a day PRN Secretions  LORazepam   Injectable 0.5 milliGRAM(s) IV Push every 2 hours PRN respirations >20 or respiratory distress  LORazepam   Injectable 0.5 milliGRAM(s) IV Push every 4 hours PRN BREAKTHROUGH agitation  morphine  - Injectable 2 milliGRAM(s) IV Push every 3 hours PRN BREAKTHROUGH pain/dyspnea not relieved by infusion      PHYSICAL EXAM:    Vital Signs Last 24 Hrs  T(C): --  T(F): --  HR: --  BP: --  BP(mean): --  RR: --  SpO2: --    General:  lethargic     HEENT: dry mouth      Lungs: shallow breathing    CV: normal      GI:  incontinent    :  rainey oliguria    MSK: weakness    bedbound    Neuro: cognitive impairment     Skin: thin dry skin, abrasion/wounds lower extremities    LABS:  no labs, comfort care    RADIOLOGY & ADDITIONAL STUDIES:  CXR 06.17.23  IMPRESSION:  First study is from 6:44 PM and shows enlarged heart. Right-sided   coronary artery stents or dense calcification present. Right chest tube.   Prominent interstitial markings bilaterally  COPD. Small bilateral effusions. Prominent pulmonary arteries likely   secondary to pulmonary hypertension. Degenerative changes are seen in the   bones...  The follow-up is from 6/17, 12:44 AM and shows improving aeration at the   right base. Enlarging left effusion..  The follow-up is from 10:19 AM and shows interval placement of a left   midline. Otherwise findings are stable..    CT angio abd / pelvis 06.16.23  IMPRESSION:  As on same day CT abdomen/pelvis, short segment dissection of the distal   descending thoracic aorta, likelyold. Age-indeterminate associated   penetrating atherosclerotic ulcer at the same level.  Unchanged small right hydropneumothorax with trace air component.  Unchanged displaced and nondisplaced fractures of the right seventh   through 11th ribs.  Unchanged dilated common bile duct and stone within the distal common   duct.  Multiple pancreatic cystic lesions.    CT chest 06.16.23  IMPRESSION:  Trace right pneumothorax secondary to right seventh, eighth, ninth and   10th rib fractures. The eighth rib is fractured in 2 places (flail   chest). Moderate right and trace left pleural effusion.  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 Focal ectasia   of the mid descending thoracic aorta to 2.8 cm. Limited evaluation   without IV contrast.  Dilated common duct to 1.3 cm. Questionable impacted 8mm calculus in the   distal common duct. Recommend ultrasound or MRI for additional   characterization.

## 2023-06-20 NOTE — PROGRESS NOTE ADULT - REASON FOR ADMISSION
R rib fx 7-10  Type B aortic dissection  RLE open wound

## 2023-06-20 NOTE — PROGRESS NOTE ADULT - NUTRITIONAL ASSESSMENT
This patient has been assessed with a concern for Malnutrition and has been determined to have a diagnosis/diagnoses of Severe protein-calorie malnutrition and Underweight (BMI < 19).  
This patient has been assessed with a concern for Malnutrition and has been determined to have a diagnosis/diagnoses of Severe protein-calorie malnutrition and Underweight (BMI < 19).

## 2023-06-20 NOTE — PROGRESS NOTE ADULT - ATTENDING COMMENTS
Awake but confused  Bilateral BS. R chest pigtail with SS to serous drainage  Lungs bilateral expanded  Hemodynamic intact  Abdomen soft, active BS  Neurologic grossly unchanged, is evaluated by hospice

## 2023-06-20 NOTE — PROGRESS NOTE ADULT - SUBJECTIVE AND OBJECTIVE BOX
SUBJECTIVE/24 hour events:  Patient is a 89yFemale s/p fall sustaining multiple right rib fxs with associated  right hemothorax requiring a pig tail. Patient made comfort care, palliative following, on morphine gtt      Vital Signs Last 24 Hrs  T(C): --  T(F): --  HR: --  BP: --  BP(mean): --  RR: --  SpO2: --      Drug Dosing Weight  Height (cm): 152.4 (16 Jun 2023 08:25)  Weight (kg): 44 (16 Jun 2023 08:25)  BMI (kg/m2): 18.9 (16 Jun 2023 08:25)  BSA (m2): 1.37 (16 Jun 2023 08:25)  I&O's Detail    18 Jun 2023 07:01  -  19 Jun 2023 07:00  --------------------------------------------------------  IN:    multiple electrolytes Injection Type 1.: 1008 mL  Total IN: 1008 mL    OUT:    Chest Tube (mL): 270 mL    Indwelling Catheter - Urethral (mL): 295 mL  Total OUT: 565 mL    Total NET: 443 mL      19 Jun 2023 07:01  -  20 Jun 2023 00:18  --------------------------------------------------------  IN:  Total IN: 0 mL    OUT:    Indwelling Catheter - Urethral (mL): 0 mL  Total OUT: 0 mL    Total NET: 0 mL        Allergies    penicillin (Unknown)    Intolerances                              10.5   7.71  )-----------( 139      ( 18 Jun 2023 10:00 )             32.5   06-18    135  |  101  |  35.7<H>  ----------------------------<  84  4.9   |  20.0<L>  |  2.03<H>    Ca    7.7<L>      18 Jun 2023 10:00  Phos  5.9     06-18  Mg     2.3     06-18        ROS:    PHYSICAL EXAM:  Constitutional: resting comfortably    Respiratory: right pig tail in place, dressing with moderate drainage      MEDICATIONS  (STANDING):  lidocaine   4% Patch 2 Patch Transdermal every 24 hours  morphine  Infusion 2 mG/Hr (2 mL/Hr) IV Continuous <Continuous>    MEDICATIONS  (PRN):  glycopyrrolate Injectable 0.4 milliGRAM(s) IV Push four times a day PRN Secretions  LORazepam   Injectable 0.5 milliGRAM(s) IV Push every 2 hours PRN respirations >20 or respiratory distress      RADIOLOGY STUDIES:    CULTURES:

## 2023-06-20 NOTE — CONSULT NOTE ADULT - REASON FOR ADMISSION
R rib fx 7-10  Type B aortic dissection  RLE open wound

## 2023-06-29 NOTE — CHART NOTE - NSCHARTNOTEFT_GEN_A_CORE
Palliative care social work note.    Bereavement call made to patients daughter Joann. Support and resources offered.
